# Patient Record
Sex: FEMALE | Race: WHITE | NOT HISPANIC OR LATINO | Employment: FULL TIME | URBAN - METROPOLITAN AREA
[De-identification: names, ages, dates, MRNs, and addresses within clinical notes are randomized per-mention and may not be internally consistent; named-entity substitution may affect disease eponyms.]

---

## 2017-05-24 ENCOUNTER — OFFICE VISIT (OUTPATIENT)
Dept: MEDICAL GROUP | Facility: PHYSICIAN GROUP | Age: 57
End: 2017-05-24
Payer: COMMERCIAL

## 2017-05-24 VITALS
DIASTOLIC BLOOD PRESSURE: 80 MMHG | SYSTOLIC BLOOD PRESSURE: 108 MMHG | HEART RATE: 96 BPM | OXYGEN SATURATION: 97 % | BODY MASS INDEX: 28.88 KG/M2 | HEIGHT: 63 IN | WEIGHT: 163 LBS | RESPIRATION RATE: 14 BRPM | TEMPERATURE: 98.8 F

## 2017-05-24 DIAGNOSIS — Z86.010 HISTORY OF COLON POLYPS: ICD-10-CM

## 2017-05-24 DIAGNOSIS — I10 ESSENTIAL HYPERTENSION: ICD-10-CM

## 2017-05-24 DIAGNOSIS — K63.5 POLYP OF COLON, UNSPECIFIED PART OF COLON, UNSPECIFIED TYPE: ICD-10-CM

## 2017-05-24 DIAGNOSIS — N81.10 FEMALE BLADDER PROLAPSE: ICD-10-CM

## 2017-05-24 DIAGNOSIS — G43.109 MIGRAINE WITH AURA AND WITHOUT STATUS MIGRAINOSUS, NOT INTRACTABLE: ICD-10-CM

## 2017-05-24 DIAGNOSIS — M79.671 RIGHT FOOT PAIN: ICD-10-CM

## 2017-05-24 DIAGNOSIS — E78.5 HYPERLIPIDEMIA WITH TARGET LDL LESS THAN 130: ICD-10-CM

## 2017-05-24 DIAGNOSIS — E04.9 GOITER: ICD-10-CM

## 2017-05-24 DIAGNOSIS — Z12.39 SCREENING FOR BREAST CANCER: ICD-10-CM

## 2017-05-24 PROCEDURE — 99214 OFFICE O/P EST MOD 30 MIN: CPT | Performed by: NURSE PRACTITIONER

## 2017-05-24 ASSESSMENT — PATIENT HEALTH QUESTIONNAIRE - PHQ9: CLINICAL INTERPRETATION OF PHQ2 SCORE: 1

## 2017-05-24 NOTE — ASSESSMENT & PLAN NOTE
Patient states that after she lost several pounds she went off the Lipitor. Her last lipids showed normal levels. It has been a year since her last labs. We will redraw and monitor. Patient is exercising regularly

## 2017-05-24 NOTE — ASSESSMENT & PLAN NOTE
Patient states that she was diagnosed with a goiter many years ago. Last TSH a year ago was within normal limits. It is been a year since that lab draw. Denies any cold intolerance, weight gain, hair loss. States her energy level has been good. She denies any edema or changes in her skin or nails. She denies difficulty swallowing.

## 2017-05-24 NOTE — ASSESSMENT & PLAN NOTE
Patient states that she has in the past seen Dr. Panchito rutledge. She reports some right foot pain especially on the lateral aspect. There she denies swelling or injury. She states that sometimes the pain just burns and aches.  She has tried essential oils.

## 2017-05-24 NOTE — ASSESSMENT & PLAN NOTE
Patient had a bowel prep and scheduled colonoscopy last fall. After she went to the colonoscopy appointment, she was unable to complete the colonoscopy due to incomplete prep. She has not returned since then. She does state that she did have a history of some polyps. She denies any bowel changes at this time including constipation diarrhea or blood in her stool. Referral placed for

## 2017-05-24 NOTE — MR AVS SNAPSHOT
"        Laya Barnes   2017 4:20 PM   Office Visit   MRN: [id number]    Department:  [facility]   Dept Phone:  [phone]    Description:  Female : 1960   Provider:  DINO Johnson           Reason for Visit     Establish Care           Allergies as of 2017     No Known Allergies      You were diagnosed with     Female bladder prolapse   [797981]       Essential hypertension   [7377360]       Screening for breast cancer   [704459]       History of colon polyps   [098464]       Right foot pain   [315008]         Vital Signs     Blood Pressure Pulse Temperature Respirations Height Weight    108/80 mmHg 96 37.1 °C (98.8 °F) 14 1.6 m (5' 3\") 73.936 kg (163 lb)    Body Mass Index Oxygen Saturation Smoking Status             28.88 kg/m2 97% Never Smoker          Basic Information     Date Of Birth Sex Race Ethnicity Preferred Language    1960 Female White Non- English      Problem List              ICD-10-CM Priority Class Noted - Resolved    Goiter E04.9   Unknown - Present    Migraine with aura and without status migrainosus, not intractable G43.109   Unknown - Present    HTN (hypertension) I10   2011 - Present    Flying phobia F40.243   2011 - Present    Colon polyps K63.5   2012 - Present    Hyperlipidemia with target LDL less than 130 E78.5   2012 - Present    Benign positional vertigo H81.10   2013 - Present    DDD (degenerative disc disease), cervical M50.30   2015 - Present    DDD (degenerative disc disease), lumbar M51.36   2015 - Present    Female bladder prolapse N81.10   2016 - Present    Right foot pain M79.671   2017 - Present      Health Maintenance        Date Due Completion Dates    COLONOSCOPY 2016 (N/S), 5/15/2011 (N/S)    Override on 2011: (N/S)    Override on 5/15/2011: (N/S)    MAMMOGRAM 7/10/2016 7/10/2015, 2013, 2013 (Done), 2012, 2011, 2010, 2010, " 7/21/2008, 7/21/2008, 7/17/2007, 7/17/2007, 4/11/2005, 1/23/2004    Override on 7/11/2013: Done    IMM DTaP/Tdap/Td Vaccine (2 - Td) 3/26/2017 3/26/2007    PAP SMEAR 7/15/2018 7/15/2015, 4/6/2012, 4/4/2011, 4/2/2010            Current Immunizations     Influenza TIV (IM) 10/21/2014    SHINGLES VACCINE 7/15/2015    Tdap Vaccine 3/26/2007      Below and/or attached are the medications your provider expects you to take. Review all of your home medications and newly ordered medications with your provider and/or pharmacist. Follow medication instructions as directed by your provider and/or pharmacist. Please keep your medication list with you and share with your provider. Update the information when medications are discontinued, doses are changed, or new medications (including over-the-counter products) are added; and carry medication information at all times in the event of emergency situations     Allergies:  No Known Allergies          Medications  Valid as of: May 24, 2017 -  4:53 PM    Generic Name Brand Name Tablet Size Instructions for use    Atorvastatin Calcium (Tab) LIPITOR 20 MG Take 1 Tab by mouth every day. TAKE ONE TABLET BY MOUTH DAILY        Calcium Citrate-Vitamin D   Take  by mouth every day.        Fluticasone Propionate (Suspension) FLONASE 50 MCG/ACT Spray 1 Spray in nose every day.        Meclizine HCl (Tab) ANTIVERT 12.5 MG Take 1 Tab by mouth 3 times a day as needed for Dizziness.        Omega-3 Fatty Acids (Cap) OMEGA 3 FA 1000 MG Take 1,000 mg by mouth 3 times a day, with meals.        Triamcinolone Acetonide (Ointment) KENALOG 0.1 % Apply to affected areas on hands twice daily PRN rash        .                 Medicines prescribed today were sent to:     [facility] #[id number] - [place], [place] - [address] [place] [address] AT [address] [address]    Phone: [phone] Fax: [phone]    Open 24 Hours?: No      Medication refill instructions:       If your prescription bottle  indicates you have medication refills left, it is not necessary to call your provider’s office. Please contact your pharmacy and they will refill your medication.    If your prescription bottle indicates you do not have any refills left, you may request refills at any time through one of the following ways: The online Revolve. system (except Urgent Care), by calling your provider’s office, or by asking your pharmacy to contact your provider’s office with a refill request. Medication refills are processed only during regular business hours and may not be available until the next business day. Your provider may request additional information or to have a follow-up visit with you prior to refilling your medication.   *Please Note: Medication refills are assigned a new Rx number when refilled electronically. Your pharmacy may indicate that no refills were authorized even though a new prescription for the same medication is available at the pharmacy. Please request the medicine by name with the pharmacy before contacting your provider for a refill.        Your To Do List     Future Labs/Procedures Complete By Expires    COMP METABOLIC PANEL  As directed 5/25/2018    LIPID PROFILE  As directed 5/25/2018    MA-SCREEN MAMMO W/CAD-BILAT  As directed 6/25/2018    TSH  As directed 5/25/2018    VITAMIN D,25 HYDROXY  As directed 5/25/2018      Referral     A referral request has been sent to our patient care coordination department. Please allow 3-5 business days for us to process this request and contact you either by phone or mail. If you do not hear from us by the 5th business day, please call us at [phone].           Revolve. Access Code: [email or web address]  Expires: 6/23/2017  4:04 PM    Revolve.  A secure, online tool to manage your health information     [employer]’s Revolve.® is a secure, online tool that connects you to your personalized health information from the privacy of your home -- day or night - making it  very easy for you to manage your healthcare. Once the activation process is completed, you can even access your medical information using the ComVibe svitlana, which is available for free in the Apple Svitlana store or Google Play store.     ComVibe provides the following levels of access (as shown below):   My Chart Features   [facility] Doctor [facility]  Specialists [facility]  Urgent  Care Non-[facility]  Primary Care  Doctor   Email your healthcare team securely and privately 24/7 X X X    Manage appointments: schedule your next appointment; view details of past/upcoming appointments X      Request prescription refills. X      View recent personal medical records, including lab and immunizations X X X X   View health record, including health history, allergies, medications X X X X   Read reports about your outpatient visits, procedures, consult and ER notes X X X X   See your discharge summary, which is a recap of your hospital and/or ER visit that includes your diagnosis, lab results, and care plan. X X       How to register for ComVibe:  1. Go to  [email or web address].  2. Click on the Sign Up Now box, which takes you to the New Member Sign Up page. You will need to provide the following information:  a. Enter your ComVibe Access Code exactly as it appears at the top of this page. (You will not need to use this code after you’ve completed the sign-up process. If you do not sign up before the expiration date, you must request a new code.)   b. Enter your date of birth.   c. Enter your home email address.   d. Click Submit, and follow the next screen’s instructions.  3. Create a ComVibe ID. This will be your ComVibe login ID and cannot be changed, so think of one that is secure and easy to remember.  4. Create a ComVibe password. You can change your password at any time.  5. Enter your Password Reset Question and Answer. This can be used at a later time if you forget your password.   6. Enter your e-mail address. This  allows you to receive e-mail notifications when new information is available in [employer].  7. Click Sign Up. You can now view your health information.    For assistance activating your [employer] account, call [phone]

## 2017-05-24 NOTE — ASSESSMENT & PLAN NOTE
Patient states that her last migraine was approximately 2-1/2 weeks ago. She states she does have an aura with migraine but she states that if she takes Advil as soon as she has the aura she is able to improve the outcome of her headache. States she maybe has 4-5 migraines in a year. She is not aware of any consistent trigger.

## 2017-05-24 NOTE — PROGRESS NOTES
Chief Complaint   Patient presents with   • Establish Care       Subjective:   Laya Barnes is a 57 y.o. female here today to establish care and for evaluation and management of:    Female bladder prolapse  Seeing Dr. Angela Toth for bladder prolapse.     HTN (hypertension)  Not taking medication since losing weight a few years ago.  bp at goal today.     Colon polyps  Patient had a bowel prep and scheduled colonoscopy last fall. After she went to the colonoscopy appointment, she was unable to complete the colonoscopy due to incomplete prep. She has not returned since then. She does state that she did have a history of some polyps. She denies any bowel changes at this time including constipation diarrhea or blood in her stool. Referral placed for    Hyperlipidemia with target LDL less than 130  Patient states that after she lost several pounds she went off the Lipitor. Her last lipids showed normal levels. It has been a year since her last labs. We will redraw and monitor. Patient is exercising regularly    Goiter  Patient states that she was diagnosed with a goiter many years ago. Last TSH a year ago was within normal limits. It is been a year since that lab draw. Denies any cold intolerance, weight gain, hair loss. States her energy level has been good. She denies any edema or changes in her skin or nails. She denies difficulty swallowing.    Migraine with aura and without status migrainosus, not intractable  Patient states that her last migraine was approximately 2-1/2 weeks ago. She states she does have an aura with migraine but she states that if she takes Advil as soon as she has the aura she is able to improve the outcome of her headache. States she maybe has 4-5 migraines in a year. She is not aware of any consistent trigger.    Right foot pain  Patient states that she has in the past seen Dr. Panchito rutledge. She reports some right foot pain especially on the lateral aspect. There she denies  "swelling or injury. She states that sometimes the pain just burns and aches.  She has tried essential oils.          Current medicines (including changes today)  Current Outpatient Prescriptions   Medication Sig Dispense Refill   • fluticasone (FLONASE) 50 MCG/ACT nasal spray Spray 1 Spray in nose every day. 1 Bottle 1   • atorvastatin (LIPITOR) 20 MG Tab Take 1 Tab by mouth every day. TAKE ONE TABLET BY MOUTH DAILY 30 Tab 1   • meclizine (ANTIVERT) 12.5 MG Tab Take 1 Tab by mouth 3 times a day as needed for Dizziness. 30 Tab 1   • triamcinolone acetonide (KENALOG) 0.1 % Ointment Apply to affected areas on hands twice daily PRN rash 60 g 1   • docosahexanoic acid (OMEGA 3 FA) 1000 MG CAPS Take 1,000 mg by mouth 3 times a day, with meals.     • Calcium Carbonate-Vitamin D (CALCIUM + D PO) Take  by mouth every day.       No current facility-administered medications for this visit.     She  has a past medical history of Hypertriglyceridemia; Goiter; Migraine; HTN (hypertension) (11/4/2011); Flying phobia (11/4/2011); Colon polyps (6/11); Hyperlipidemia LDL goal < 130 (6/29/2012); Hand eczema (8/4/2014); Bilateral foot pain (11/9/2015); and Hypertension.    ROS as stated in history of present illness.  No chest pain, no shortness of breath, no abdominal pain       Objective:     Blood pressure 108/80, pulse 96, temperature 37.1 °C (98.8 °F), resp. rate 14, height 1.6 m (5' 3\"), weight 73.936 kg (163 lb), SpO2 97 %. Body mass index is 28.88 kg/(m^2).   Physical Exam:  Constitutional: Alert, no distress.  Skin: Warm, dry, good turgor,no cyanosis, no rashes in visible areas.  Eye: Equal, round and reactive, conjunctiva clear, lids normal.  Ears: No tenderness, no discharge.  External canals are without any significant edema or erythema.  Tympanic membranes are without any inflammation, no effusion.  Gross auditory acuity is intact.  Nose: symmetrical without tenderness, no discharge.  Mouth/Throat: lips without lesion.  " Oropharynx clear.  Throat without erythema, exudates or tonsillar enlargement.  Neck: Trachea midline, no masses, no obvious thyroid enlargement.. No cervical or supraclavicular lymphadenopathy. Range of motion within normal limits.  Neuro: Cranial nerves 2-12 grossly intact.  No sensory deficit.  Respiratory: Unlabored respiratory effort, lungs clear to auscultation, no wheezes, no ronchi.  Cardiovascular: Normal S1, S2, no murmur, no edema.  Abdomen: Soft, non-tender, no masses, no guarding,  no hepatosplenomegaly.  MSK: Right foot with area of tenderness on the lateral aspect of the fifth metatarsal. No swelling redness noted.  Psych: Alert and oriented x3, normal affect and mood and judgement.        Assessment and Plan:   The following treatment plan was discussed    1. Female bladder prolapse  This is a new problem to me. Chronic. Unstable. She is being followed by Dr. Angela Toth. She is having the Selina Glo treatments. Next appointment in 2 weeks.    2. Essential hypertension  This is a new problem to me. Chronic. Stable. Patient is no longer on any medications after she lost a lot of weight. Her blood pressure today is 108/80. We discussed diet and exercise. We will draw labs to rule out any.metabolic issues.  - COMP METABOLIC PANEL; Future  - LIPID PROFILE; Future  - TSH; Future  - VITAMIN D,25 HYDROXY; Future    3. Screening for breast cancer  Patient is overdue for mammogram screening. Denies any breast changes or family history of breast cancer. Mammogram order placed. We'll monitor results.  - MA-SCREEN MAMMO W/CAD-BILAT; Future    4. History of colon polyps  Patient overdue for colonoscopy. Referral placed. We'll monitor results.  - REFERRAL TO GASTROENTEROLOGY    5. Right foot pain  This is a new problem to me. Acute. Ongoing. Patient to call her podiatrist, Dr. Ruiz for follow-up. She RBCs him for plantar fasciitis. Monitor and follow.    6. Polyp of colon, unspecified part of colon,  unspecified type  See problem #4.    7. Hyperlipidemia with target LDL less than 130  This is a new problem to me. Chronic. Stable. Lipids one year ago were at goal. She is not taking any medications. Will redraw lipids to monitor this level. Monitor and follow.    8. Goiter  This is a new problem to me. Chronic. Stable. Patient is due for TSH level. We will draw this and monitor results.    9. Migraine with aura and without status migrainosus, not intractable  This is a new problem to me. Chronic. Stable. Continue with Advil when necessary for migraines. Patient to return in one year or sooner      Followup: Return in about 1 year (around 5/24/2018) for Annual.

## 2017-06-21 ENCOUNTER — HOSPITAL ENCOUNTER (OUTPATIENT)
Dept: RADIOLOGY | Facility: MEDICAL CENTER | Age: 57
End: 2017-06-21
Attending: NURSE PRACTITIONER
Payer: COMMERCIAL

## 2017-06-21 DIAGNOSIS — Z12.39 SCREENING FOR BREAST CANCER: ICD-10-CM

## 2017-06-21 PROCEDURE — 77063 BREAST TOMOSYNTHESIS BI: CPT

## 2017-06-22 ENCOUNTER — TELEPHONE (OUTPATIENT)
Dept: MEDICAL GROUP | Facility: PHYSICIAN GROUP | Age: 57
End: 2017-06-22

## 2017-06-22 NOTE — TELEPHONE ENCOUNTER
----- Message from DINO Johnson sent at 6/21/2017  5:25 PM PDT -----  Please let Laya know that her mammogram results are back.  Both breast have scattered density, but no changes since last mammogram.  No evidence of malignancy.  Recommend mammo in one year.  DINO Johnson

## 2017-06-24 ENCOUNTER — HOSPITAL ENCOUNTER (OUTPATIENT)
Dept: LAB | Facility: MEDICAL CENTER | Age: 57
End: 2017-06-24
Attending: NURSE PRACTITIONER
Payer: COMMERCIAL

## 2017-06-24 DIAGNOSIS — I10 ESSENTIAL HYPERTENSION: ICD-10-CM

## 2017-06-24 LAB
25(OH)D3 SERPL-MCNC: 30 NG/ML (ref 30–100)
ALBUMIN SERPL BCP-MCNC: 4.2 G/DL (ref 3.2–4.9)
ALBUMIN/GLOB SERPL: 1.4 G/DL
ALP SERPL-CCNC: 67 U/L (ref 30–99)
ALT SERPL-CCNC: 11 U/L (ref 2–50)
ANION GAP SERPL CALC-SCNC: 4 MMOL/L (ref 0–11.9)
AST SERPL-CCNC: 14 U/L (ref 12–45)
BILIRUB SERPL-MCNC: 0.3 MG/DL (ref 0.1–1.5)
BUN SERPL-MCNC: 15 MG/DL (ref 8–22)
CALCIUM SERPL-MCNC: 9.1 MG/DL (ref 8.5–10.5)
CHLORIDE SERPL-SCNC: 109 MMOL/L (ref 96–112)
CHOLEST SERPL-MCNC: 233 MG/DL (ref 100–199)
CO2 SERPL-SCNC: 27 MMOL/L (ref 20–33)
CREAT SERPL-MCNC: 0.95 MG/DL (ref 0.5–1.4)
GFR SERPL CREATININE-BSD FRML MDRD: >60 ML/MIN/1.73 M 2
GLOBULIN SER CALC-MCNC: 2.9 G/DL (ref 1.9–3.5)
GLUCOSE SERPL-MCNC: 95 MG/DL (ref 65–99)
HDLC SERPL-MCNC: 46 MG/DL
LDLC SERPL CALC-MCNC: 146 MG/DL
POTASSIUM SERPL-SCNC: 4.2 MMOL/L (ref 3.6–5.5)
PROT SERPL-MCNC: 7.1 G/DL (ref 6–8.2)
SODIUM SERPL-SCNC: 140 MMOL/L (ref 135–145)
TRIGL SERPL-MCNC: 207 MG/DL (ref 0–149)
TSH SERPL DL<=0.005 MIU/L-ACNC: 2.92 UIU/ML (ref 0.3–3.7)

## 2017-06-24 PROCEDURE — 80053 COMPREHEN METABOLIC PANEL: CPT

## 2017-06-24 PROCEDURE — 80061 LIPID PANEL: CPT

## 2017-06-24 PROCEDURE — 84443 ASSAY THYROID STIM HORMONE: CPT

## 2017-06-24 PROCEDURE — 36415 COLL VENOUS BLD VENIPUNCTURE: CPT

## 2017-06-24 PROCEDURE — 82306 VITAMIN D 25 HYDROXY: CPT

## 2017-06-26 ENCOUNTER — TELEPHONE (OUTPATIENT)
Dept: MEDICAL GROUP | Facility: PHYSICIAN GROUP | Age: 57
End: 2017-06-26

## 2017-06-26 DIAGNOSIS — E78.5 HYPERLIPIDEMIA WITH TARGET LDL LESS THAN 130: ICD-10-CM

## 2017-06-26 RX ORDER — ATORVASTATIN CALCIUM 20 MG/1
20 TABLET, FILM COATED ORAL DAILY
Qty: 30 TAB | Refills: 6 | Status: SHIPPED | OUTPATIENT
Start: 2017-06-26 | End: 2017-06-28 | Stop reason: SDUPTHER

## 2017-06-26 NOTE — TELEPHONE ENCOUNTER
----- Message from DINO Johnson sent at 6/26/2017  7:20 AM PDT -----  Please let Laya know that her lab work results are back.  Her liver and thyroid function all look good.  Kidney function is good.  One of the kidney tests is a little low, but it is stable when compared to the last 3 years.  Your cholesterol and triglycerides are slightly elevated.  Are you taking lipitor or not?  I see it on your med list, but not sure if you are taking.  Please let me know.  If you are not taking, I think it would be good to get back on it.  I will await your reply.  DINO Johnson

## 2017-06-26 NOTE — TELEPHONE ENCOUNTER
Please let patient know that I have called in the Lipitor to [facility].  Please start taking daily.  Continue to focus on healthy diet and getting 20 minutes of cardiovascular exercise daily.  Let's recheck in 6 months.  Please call to make a follow up appointment with your provider.  WILLOW Johnson.

## 2017-06-27 ENCOUNTER — TELEPHONE (OUTPATIENT)
Dept: MEDICAL GROUP | Facility: PHYSICIAN GROUP | Age: 57
End: 2017-06-27

## 2017-06-27 DIAGNOSIS — E78.5 HYPERLIPIDEMIA WITH TARGET LDL LESS THAN 130: ICD-10-CM

## 2017-06-27 NOTE — TELEPHONE ENCOUNTER
Patient notified of results . Has not been taking her Lipitor for a year. Willing to restart due to her results

## 2017-06-28 RX ORDER — ATORVASTATIN CALCIUM 20 MG/1
20 TABLET, FILM COATED ORAL DAILY
Qty: 30 TAB | Refills: 6 | Status: SHIPPED | OUTPATIENT
Start: 2017-06-28 | End: 2018-06-26

## 2017-06-28 NOTE — TELEPHONE ENCOUNTER
Please let patient know that I have called in the lipitor to [facility].  Take one tablet daily, preferrably at night.  I would also recommend focusing on increasing exercise, at least 150 minutes in a week of walking, biking, etc (something that gets your heart rate up.)  WILLOW Johnson.

## 2018-01-06 ENCOUNTER — OFFICE VISIT (OUTPATIENT)
Dept: URGENT CARE | Facility: PHYSICIAN GROUP | Age: 58
End: 2018-01-06
Payer: COMMERCIAL

## 2018-01-06 VITALS
BODY MASS INDEX: 29.23 KG/M2 | DIASTOLIC BLOOD PRESSURE: 84 MMHG | HEIGHT: 63 IN | WEIGHT: 165 LBS | SYSTOLIC BLOOD PRESSURE: 110 MMHG | OXYGEN SATURATION: 100 % | TEMPERATURE: 98.4 F | HEART RATE: 77 BPM

## 2018-01-06 DIAGNOSIS — J06.9 VIRAL URI: ICD-10-CM

## 2018-01-06 DIAGNOSIS — J22 LRTI (LOWER RESPIRATORY TRACT INFECTION): ICD-10-CM

## 2018-01-06 PROCEDURE — 99214 OFFICE O/P EST MOD 30 MIN: CPT | Performed by: FAMILY MEDICINE

## 2018-01-06 RX ORDER — AZITHROMYCIN 250 MG/1
TABLET, FILM COATED ORAL
Qty: 6 TAB | Refills: 0 | Status: SHIPPED | OUTPATIENT
Start: 2018-01-06 | End: 2018-04-30

## 2018-01-06 RX ORDER — ALBUTEROL SULFATE 90 UG/1
2 AEROSOL, METERED RESPIRATORY (INHALATION) EVERY 6 HOURS PRN
Qty: 1 INHALER | Refills: 0 | Status: SHIPPED | OUTPATIENT
Start: 2018-01-06 | End: 2019-06-12

## 2018-01-06 RX ORDER — IPRATROPIUM BROMIDE AND ALBUTEROL SULFATE 2.5; .5 MG/3ML; MG/3ML
3 SOLUTION RESPIRATORY (INHALATION) ONCE
Status: COMPLETED | OUTPATIENT
Start: 2018-01-06 | End: 2018-01-06

## 2018-01-06 RX ADMIN — IPRATROPIUM BROMIDE AND ALBUTEROL SULFATE 3 ML: 2.5; .5 SOLUTION RESPIRATORY (INHALATION) at 13:56

## 2018-01-06 ASSESSMENT — PAIN SCALES - GENERAL: PAINLEVEL: NO PAIN

## 2018-01-06 NOTE — PROGRESS NOTES
Chief Complaint   Patient presents with   • Congestion     Cough and chest congestion x1 week     CC:  cough        Cough  This is a new problem. The current episode started 1 week ago. The problem has been gradually worsening. The problem occurs constantly. The cough is productive of sputum. Associated symptoms include nasal congestion. Pertinent negatives include no fever, headaches, sweats, weight loss or wheezing. Nothing aggravates the symptoms.  Patient has tried decongestant for the symptoms - minimal relief. There is no history of asthma.     Social History   Substance Use Topics   • Smoking status: Never Smoker   • Smokeless tobacco: Never Used   • Alcohol use Yes      Comment: Very Rare         Current Outpatient Prescriptions on File Prior to Visit   Medication Sig Dispense Refill   • atorvastatin (LIPITOR) 20 MG Tab Take 1 Tab by mouth every day. TAKE ONE TABLET BY MOUTH DAILY 30 Tab 6   • fluticasone (FLONASE) 50 MCG/ACT nasal spray Spray 1 Spray in nose every day. 1 Bottle 1   • docosahexanoic acid (OMEGA 3 FA) 1000 MG CAPS Take 1,000 mg by mouth 3 times a day, with meals.     • Calcium Carbonate-Vitamin D (CALCIUM + D PO) Take  by mouth every day.     • meclizine (ANTIVERT) 12.5 MG Tab Take 1 Tab by mouth 3 times a day as needed for Dizziness. 30 Tab 1   • triamcinolone acetonide (KENALOG) 0.1 % Ointment Apply to affected areas on hands twice daily PRN rash 60 g 1     No current facility-administered medications on file prior to visit.                 Review of Systems   Constitutional: Negative for fever and weight loss.   HENT: negative for ear pain.    Cardiovascular - denies chest pain or dyspnea  Respiratory: Positive for cough.  Cough is productive.  Negative for wheezing.    Neurological: Negative for headaches.   GI - denies nausea, vomiting or diarrhea  Neuro - denies numbness or tingling.            Objective:     Blood pressure 110/84, pulse 77, temperature 36.9 °C (98.4 °F), height 1.6 m  "(5' 3\"), weight 74.8 kg (165 lb), SpO2 100 %, not currently breastfeeding.    Physical Exam   Constitutional: patient is oriented to person, place, and time. Patient appears well-developed and well-nourished. No distress.   HENT:   Head: Normocephalic and atraumatic.   Right Ear: External ear normal.   Left Ear: External ear normal.   Nose: Mucosal edema present. Right sinus exhibits no maxillary sinus tenderness. Left sinus exhibits no maxillary sinus tenderness.   Mouth/Throat: Mucous membranes are normal. No oral lesions. Posterior oropharyngeal erythema present. No oropharyngeal exudate or posterior oropharyngeal edema.   Eyes: Conjunctivae and EOM are normal. Pupils are equal, round, and reactive to light. Right eye exhibits no discharge. Left eye exhibits no discharge. No scleral icterus.   Neck: Normal range of motion. Neck supple. No tracheal deviation present.   Cardiovascular: Normal rate, regular rhythm and normal heart sounds.  Exam reveals no friction rub.    Pulmonary/Chest: Effort normal. No respiratory distress. Patient has no wheezes. Patient has rhonchi. Patient has no rales.    Musculoskeletal:  exhibits no edema.   Lymphadenopathy:     Patient has no cervical adenopathy  Neurological: patient is alert and oriented to person, place, and time.   Skin: Skin is warm and dry. No rash noted. No erythema.   Psychiatric: patient  has a normal mood and affect.  behavior is normal.   Nursing note and vitals reviewed.              Assessment/Plan:      LRTI (lower respiratory tract infection)   oxygen increased to 99% after neb tx  - albuterol 108 (90 Base) MCG/ACT Aero Soln inhalation aerosol; Inhale 2 Puffs by mouth every 6 hours as needed for Shortness of Breath.  Dispense: 1 Inhaler; Refill: 0  - azithromycin (ZITHROMAX) 250 MG Tab; Take as directed  Dispense: 6 Tab; Refill: 0    Follow up in one week if no improvement, sooner if symptoms worsen.     "

## 2018-04-30 ENCOUNTER — OFFICE VISIT (OUTPATIENT)
Dept: MEDICAL GROUP | Facility: PHYSICIAN GROUP | Age: 58
End: 2018-04-30
Payer: COMMERCIAL

## 2018-04-30 VITALS
SYSTOLIC BLOOD PRESSURE: 124 MMHG | OXYGEN SATURATION: 97 % | DIASTOLIC BLOOD PRESSURE: 82 MMHG | BODY MASS INDEX: 30.83 KG/M2 | RESPIRATION RATE: 12 BRPM | HEART RATE: 86 BPM | TEMPERATURE: 98.1 F | WEIGHT: 174 LBS | HEIGHT: 63 IN

## 2018-04-30 DIAGNOSIS — I10 ESSENTIAL HYPERTENSION: ICD-10-CM

## 2018-04-30 DIAGNOSIS — H81.13 BENIGN PAROXYSMAL POSITIONAL VERTIGO DUE TO BILATERAL VESTIBULAR DISORDER: ICD-10-CM

## 2018-04-30 DIAGNOSIS — M79.644 CHRONIC THUMB PAIN, RIGHT: ICD-10-CM

## 2018-04-30 DIAGNOSIS — G89.29 CHRONIC THUMB PAIN, RIGHT: ICD-10-CM

## 2018-04-30 DIAGNOSIS — Z00.00 PREVENTATIVE HEALTH CARE: ICD-10-CM

## 2018-04-30 DIAGNOSIS — E66.9 OBESITY (BMI 30-39.9): ICD-10-CM

## 2018-04-30 DIAGNOSIS — G43.109 MIGRAINE WITH AURA AND WITHOUT STATUS MIGRAINOSUS, NOT INTRACTABLE: ICD-10-CM

## 2018-04-30 DIAGNOSIS — L57.8 SUN-DAMAGED SKIN: ICD-10-CM

## 2018-04-30 DIAGNOSIS — N81.10 FEMALE BLADDER PROLAPSE: ICD-10-CM

## 2018-04-30 PROCEDURE — 99396 PREV VISIT EST AGE 40-64: CPT | Performed by: NURSE PRACTITIONER

## 2018-04-30 RX ORDER — IBUPROFEN 800 MG/1
800 TABLET ORAL EVERY 8 HOURS PRN
Qty: 60 TAB | Refills: 1 | Status: SHIPPED | OUTPATIENT
Start: 2018-04-30 | End: 2023-05-07

## 2018-04-30 ASSESSMENT — PATIENT HEALTH QUESTIONNAIRE - PHQ9: CLINICAL INTERPRETATION OF PHQ2 SCORE: 0

## 2018-05-01 NOTE — ASSESSMENT & PLAN NOTE
Has notied in the last several months some chronic right thumb pain.  Right handed.  Swelling noted at mp joint.  No bruising, or injury reported.  Has tried some icy hot without improved.

## 2018-05-01 NOTE — ASSESSMENT & PLAN NOTE
Stable.  Has a migraine every 2-3 months.  Unknown trigger.  Takes an ibuprofen as soon as they start that helps.

## 2018-05-01 NOTE — PROGRESS NOTES
Chief Complaint   Patient presents with   • Annual Exam     annual/ preventative       Subjective:   Laya Barnes is a 58 y.o. female here today for preventative exam f    Benign positional vertigo  No recent flare ups.      Migraine with aura and without status migrainosus, not intractable  Stable.  Has a migraine every 2-3 months.  Unknown trigger.  Takes an ibuprofen as soon as they start that helps.     Chronic thumb pain, right  Has notied in the last several months some chronic right thumb pain.  Right handed.  Swelling noted at mp joint.  No bruising, or injury reported.  Has tried some icy hot without improved.      HTN (hypertension)  Blood pressure at goal today    Female bladder prolapse  Seeing Dr. Toth.  Using estrace cream with good results         Current medicines (including changes today)  Current Outpatient Prescriptions   Medication Sig Dispense Refill   • ibuprofen (MOTRIN) 800 MG Tab Take 1 Tab by mouth every 8 hours as needed for Inflammation. 60 Tab 1   • docosahexanoic acid (OMEGA 3 FA) 1000 MG CAPS Take 1,000 mg by mouth 3 times a day, with meals.     • Calcium Carbonate-Vitamin D (CALCIUM + D PO) Take  by mouth every day.     • albuterol 108 (90 Base) MCG/ACT Aero Soln inhalation aerosol Inhale 2 Puffs by mouth every 6 hours as needed for Shortness of Breath. 1 Inhaler 0   • atorvastatin (LIPITOR) 20 MG Tab Take 1 Tab by mouth every day. TAKE ONE TABLET BY MOUTH DAILY 30 Tab 6   • fluticasone (FLONASE) 50 MCG/ACT nasal spray Spray 1 Spray in nose every day. 1 Bottle 1     No current facility-administered medications for this visit.      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    ROS as stated in hpi  No chest pain, no shortness of breath, no abdominal  "pain       Objective:     Blood pressure 124/82, pulse 86, temperature 36.7 °C (98.1 °F), resp. rate 12, height 1.6 m (5' 3\"), weight 78.9 kg (174 lb), SpO2 97 %. Body mass index is 30.82 kg/m². stable.   Physical Exam:  Constitutional: Alert, no distress.  Skin: Warm, dry, good turgor,no cyanosis, no rashes in visible areas.  Eye: Equal, round and reactive, conjunctiva clear, lids normal.  Ears: No tenderness, no discharge.  External canals are without any significant edema or erythema.  Tympanic membranes are without any inflammation, no effusion.  Gross auditory acuity is intact.  Nose: symmetrical without tenderness, no discharge.  Mouth/Throat: lips without lesion.  Oropharynx clear.  Throat without erythema, exudates or tonsillar enlargement.  Neck: Trachea midline, no masses, no obvious thyroid enlargement.. No cervical or supraclavicular lymphadenopathy. Range of motion within normal limits.  Neuro: Cranial nerves 2-12 grossly intact.  No sensory deficit.  Respiratory: Unlabored respiratory effort, lungs clear to auscultation, no wheezes, no ronchi.  Cardiovascular: Normal S1, S2, no murmur, no edema.  Abdomen: Soft, non-tender, no masses, no guarding,  no hepatosplenomegaly.  Psych: Alert and oriented x3, normal affect and mood and judgement.  MSK: right thumb painful and swollen at MIP joint.  CMS WNL        Assessment and Plan:   The following treatment plan was discussed    1. Benign paroxysmal positional vertigo due to bilateral vestibular disorder  Chronic, stable at this time.     2. Migraine with aura and without status migrainosus, not intractable  Chronic, Stable at this time.  Ibuprofen prn.     3. Preventative health care  Here for preventative exam.  Up to date on mammo, colonoscopy, immunizations.    - CBC WITH DIFFERENTIAL; Future  - COMP METABOLIC PANEL; Future  - LIPID PROFILE; Future  - TSH; Future  - VITAMIN D,25 HYDROXY; Future    4. Chronic thumb pain, right  This is a new problem to me. "  Chronic.  Ice, heat, thumb brace, aspercream,  Ibuprofen 800 mg bid for one week.  Monitor and follow.     5. Essential hypertension  Chronic. Stable BP at goal.  Continue diet and exercise.  No medication.     6. Sun-damaged skin  Chronic, ongoing.  Referral placed.  Monitor.   - REFERRAL TO DERMATOLOGY    7. Female bladder prolapse  Chronic, ongoing. Followed by Dr. Toth.  Vaginal cream.  Monitor.     8. Obesity (BMI 30-39.9)  CHronic. Ongoing.  Diet and exercise and lifestyle management discussed.  Monitor and follow.   - Patient identified as having weight management issue.  Appropriate orders and counseling given.      Followup: Return in about 1 year (around 4/30/2019) for Annual.

## 2018-06-25 ENCOUNTER — HOSPITAL ENCOUNTER (OUTPATIENT)
Dept: LAB | Facility: MEDICAL CENTER | Age: 58
End: 2018-06-25
Attending: NURSE PRACTITIONER
Payer: COMMERCIAL

## 2018-06-25 DIAGNOSIS — Z00.00 PREVENTATIVE HEALTH CARE: ICD-10-CM

## 2018-06-25 LAB
25(OH)D3 SERPL-MCNC: 22 NG/ML (ref 30–100)
ALBUMIN SERPL BCP-MCNC: 4 G/DL (ref 3.2–4.9)
ALBUMIN/GLOB SERPL: 1.2 G/DL
ALP SERPL-CCNC: 58 U/L (ref 30–99)
ALT SERPL-CCNC: 13 U/L (ref 2–50)
ANION GAP SERPL CALC-SCNC: 7 MMOL/L (ref 0–11.9)
AST SERPL-CCNC: 22 U/L (ref 12–45)
BASOPHILS # BLD AUTO: 0.6 % (ref 0–1.8)
BASOPHILS # BLD: 0.03 K/UL (ref 0–0.12)
BILIRUB SERPL-MCNC: 0.3 MG/DL (ref 0.1–1.5)
BUN SERPL-MCNC: 14 MG/DL (ref 8–22)
CALCIUM SERPL-MCNC: 9 MG/DL (ref 8.5–10.5)
CHLORIDE SERPL-SCNC: 108 MMOL/L (ref 96–112)
CHOLEST SERPL-MCNC: 239 MG/DL (ref 100–199)
CO2 SERPL-SCNC: 26 MMOL/L (ref 20–33)
CREAT SERPL-MCNC: 1 MG/DL (ref 0.5–1.4)
EOSINOPHIL # BLD AUTO: 0.16 K/UL (ref 0–0.51)
EOSINOPHIL NFR BLD: 3.2 % (ref 0–6.9)
ERYTHROCYTE [DISTWIDTH] IN BLOOD BY AUTOMATED COUNT: 43.1 FL (ref 35.9–50)
GLOBULIN SER CALC-MCNC: 3.3 G/DL (ref 1.9–3.5)
GLUCOSE SERPL-MCNC: 90 MG/DL (ref 65–99)
HCT VFR BLD AUTO: 41.7 % (ref 37–47)
HDLC SERPL-MCNC: 44 MG/DL
HGB BLD-MCNC: 13.2 G/DL (ref 12–16)
IMM GRANULOCYTES # BLD AUTO: 0.01 K/UL (ref 0–0.11)
IMM GRANULOCYTES NFR BLD AUTO: 0.2 % (ref 0–0.9)
LDLC SERPL CALC-MCNC: 159 MG/DL
LYMPHOCYTES # BLD AUTO: 1.72 K/UL (ref 1–4.8)
LYMPHOCYTES NFR BLD: 34.6 % (ref 22–41)
MCH RBC QN AUTO: 27.6 PG (ref 27–33)
MCHC RBC AUTO-ENTMCNC: 31.7 G/DL (ref 33.6–35)
MCV RBC AUTO: 87.2 FL (ref 81.4–97.8)
MONOCYTES # BLD AUTO: 0.37 K/UL (ref 0–0.85)
MONOCYTES NFR BLD AUTO: 7.4 % (ref 0–13.4)
NEUTROPHILS # BLD AUTO: 2.68 K/UL (ref 2–7.15)
NEUTROPHILS NFR BLD: 54 % (ref 44–72)
NRBC # BLD AUTO: 0 K/UL
NRBC BLD-RTO: 0 /100 WBC
PLATELET # BLD AUTO: 250 K/UL (ref 164–446)
PMV BLD AUTO: 11 FL (ref 9–12.9)
POTASSIUM SERPL-SCNC: 4.2 MMOL/L (ref 3.6–5.5)
PROT SERPL-MCNC: 7.3 G/DL (ref 6–8.2)
RBC # BLD AUTO: 4.78 M/UL (ref 4.2–5.4)
SODIUM SERPL-SCNC: 141 MMOL/L (ref 135–145)
TRIGL SERPL-MCNC: 181 MG/DL (ref 0–149)
TSH SERPL DL<=0.005 MIU/L-ACNC: 3.18 UIU/ML (ref 0.38–5.33)
WBC # BLD AUTO: 5 K/UL (ref 4.8–10.8)

## 2018-06-25 PROCEDURE — 80061 LIPID PANEL: CPT

## 2018-06-25 PROCEDURE — 84443 ASSAY THYROID STIM HORMONE: CPT

## 2018-06-25 PROCEDURE — 85025 COMPLETE CBC W/AUTO DIFF WBC: CPT

## 2018-06-25 PROCEDURE — 80053 COMPREHEN METABOLIC PANEL: CPT

## 2018-06-25 PROCEDURE — 36415 COLL VENOUS BLD VENIPUNCTURE: CPT

## 2018-06-25 PROCEDURE — 82306 VITAMIN D 25 HYDROXY: CPT

## 2018-06-26 ENCOUNTER — TELEPHONE (OUTPATIENT)
Dept: MEDICAL GROUP | Facility: PHYSICIAN GROUP | Age: 58
End: 2018-06-26

## 2018-06-26 DIAGNOSIS — E78.5 HYPERLIPIDEMIA WITH TARGET LDL LESS THAN 130: ICD-10-CM

## 2018-06-26 RX ORDER — ATORVASTATIN CALCIUM 20 MG/1
20 TABLET, FILM COATED ORAL DAILY
Qty: 30 TAB | Refills: 0 | Status: CANCELLED | OUTPATIENT
Start: 2018-06-26

## 2018-06-26 NOTE — TELEPHONE ENCOUNTER
----- Message from DINO Stevens sent at 6/26/2018  8:49 AM PDT -----  Please call patient to inform her of the following labs:    -Thyroid levels normal  -Blood count normal   -Liver and kidney function are normal  -Cholesterol is not well controlled. Please clarify if she has been taking her a atorvastatin or not    -Vitamin D is slightly low. Please start OTC vitamin D 2000 units daily in addition to any vitamin D she is already taking    DINO Stevens  Covering for JONATHAN Johnson

## 2018-06-26 NOTE — TELEPHONE ENCOUNTER
Although it is not well controlled, in the big picture of her as a whole, cholesterol medication is not recommended yet. Her 10 year risk of having a heart attack or stroke is on 3.5% and the recommendation for therapy is 7.5% or greater. I would recommend lifestyle modifications with healthy nutrition and regular physical activity. Monitor cholesterol annually    WILLOW Stevens.

## 2018-06-26 NOTE — TELEPHONE ENCOUNTER
Pt. Notified. She has not been taking it and had discussed this with Roseline and they were going to wait and see. She is willing to go back on. Please ok refill.

## 2018-10-23 ENCOUNTER — OFFICE VISIT (OUTPATIENT)
Dept: URGENT CARE | Facility: PHYSICIAN GROUP | Age: 58
End: 2018-10-23
Payer: COMMERCIAL

## 2018-10-23 VITALS
HEIGHT: 63 IN | WEIGHT: 174 LBS | HEART RATE: 85 BPM | OXYGEN SATURATION: 96 % | BODY MASS INDEX: 30.83 KG/M2 | DIASTOLIC BLOOD PRESSURE: 90 MMHG | RESPIRATION RATE: 14 BRPM | SYSTOLIC BLOOD PRESSURE: 128 MMHG | TEMPERATURE: 98.4 F

## 2018-10-23 DIAGNOSIS — J01.00 ACUTE NON-RECURRENT MAXILLARY SINUSITIS: ICD-10-CM

## 2018-10-23 PROCEDURE — 99214 OFFICE O/P EST MOD 30 MIN: CPT | Performed by: FAMILY MEDICINE

## 2018-10-23 RX ORDER — AMOXICILLIN AND CLAVULANATE POTASSIUM 875; 125 MG/1; MG/1
1 TABLET, FILM COATED ORAL 2 TIMES DAILY
Qty: 14 TAB | Refills: 0 | Status: SHIPPED | OUTPATIENT
Start: 2018-10-23 | End: 2018-10-30

## 2018-10-28 ASSESSMENT — ENCOUNTER SYMPTOMS
VOMITING: 0
NAUSEA: 0
SINUS PAIN: 1
HEADACHES: 1

## 2018-10-28 NOTE — PROGRESS NOTES
"Subjective:   Laya Barnes is a 58 y.o. female who presents for URI (x 10 days)        URI    This is a new problem. The current episode started 1 to 4 weeks ago. The problem has been gradually worsening. There has been no fever. Associated symptoms include congestion, headaches and sinus pain. Pertinent negatives include no nausea or vomiting.     Review of Systems   HENT: Positive for congestion and sinus pain.    Gastrointestinal: Negative for nausea and vomiting.   Neurological: Positive for headaches.     No Known Allergies   Objective:   /90   Pulse 85   Temp 36.9 °C (98.4 °F)   Resp 14   Ht 1.6 m (5' 3\")   Wt 78.9 kg (174 lb)   SpO2 96%   BMI 30.82 kg/m²   Physical Exam   Constitutional: She is oriented to person, place, and time. She appears well-developed and well-nourished. No distress.   HENT:   Head: Normocephalic and atraumatic.   Nose: Mucosal edema and rhinorrhea present. Right sinus exhibits maxillary sinus tenderness. Left sinus exhibits maxillary sinus tenderness.   Eyes: Pupils are equal, round, and reactive to light. Conjunctivae and EOM are normal.   Cardiovascular: Normal rate and regular rhythm.    No murmur heard.  Pulmonary/Chest: Effort normal and breath sounds normal. No respiratory distress.   Abdominal: Soft. She exhibits no distension. There is no tenderness.   Neurological: She is alert and oriented to person, place, and time. She has normal reflexes. No sensory deficit.   Skin: Skin is warm and dry.   Psychiatric: She has a normal mood and affect.         Assessment/Plan:   Assessment    1. Acute non-recurrent maxillary sinusitis  - amoxicillin-clavulanate (AUGMENTIN) 875-125 MG Tab; Take 1 Tab by mouth 2 times a day for 7 days.  Dispense: 14 Tab; Refill: 0    Differential diagnosis, natural history, supportive care, and indications for immediate follow-up discussed.       "

## 2019-02-19 ENCOUNTER — OFFICE VISIT (OUTPATIENT)
Dept: URGENT CARE | Facility: PHYSICIAN GROUP | Age: 59
End: 2019-02-19
Payer: COMMERCIAL

## 2019-02-19 VITALS
OXYGEN SATURATION: 100 % | SYSTOLIC BLOOD PRESSURE: 122 MMHG | TEMPERATURE: 97.8 F | HEART RATE: 100 BPM | WEIGHT: 162 LBS | BODY MASS INDEX: 28.7 KG/M2 | HEIGHT: 63 IN | DIASTOLIC BLOOD PRESSURE: 92 MMHG | RESPIRATION RATE: 18 BRPM

## 2019-02-19 DIAGNOSIS — H66.90 ACUTE OTITIS MEDIA, UNSPECIFIED OTITIS MEDIA TYPE: ICD-10-CM

## 2019-02-19 DIAGNOSIS — J01.00 ACUTE NON-RECURRENT MAXILLARY SINUSITIS: ICD-10-CM

## 2019-02-19 PROCEDURE — 99214 OFFICE O/P EST MOD 30 MIN: CPT | Performed by: PHYSICIAN ASSISTANT

## 2019-02-19 RX ORDER — AMOXICILLIN AND CLAVULANATE POTASSIUM 875; 125 MG/1; MG/1
1 TABLET, FILM COATED ORAL 2 TIMES DAILY
Qty: 20 TAB | Refills: 0 | Status: SHIPPED | OUTPATIENT
Start: 2019-02-19 | End: 2019-03-01

## 2019-02-19 RX ORDER — FLUTICASONE PROPIONATE 50 MCG
1 SPRAY, SUSPENSION (ML) NASAL DAILY
Qty: 16 G | Refills: 0 | Status: SHIPPED
Start: 2019-02-19 | End: 2019-12-19

## 2019-02-19 ASSESSMENT — ENCOUNTER SYMPTOMS
HOARSE VOICE: 1
SINUS PRESSURE: 1
COUGH: 1
SORE THROAT: 1

## 2019-02-19 NOTE — LETTER
[facility] [place]  [address] [address]     February 19, 2019    Patient: Laya Barnes   YOB: 1960   Date of Visit: 2/19/2019       To Whom It May Concern:    Laya Barnes was seen and treated in our department on 2/19/2019. Please excuse her from work 2/19-2/20.    Sincerely,     Ming Mcfarland

## 2019-02-19 NOTE — PROGRESS NOTES
Subjective:   Laya Barnes is a 58 y.o. female who presents for Cough (congestion, sore [id number])        Sinusitis   This is a new problem. The current episode started 1 to 4 weeks ago (2 weeks). The problem has been waxing and waning (was improving and now worsening) since onset. There has been no fever. The pain is moderate. Associated symptoms include congestion, coughing, ear pain, a hoarse voice, sinus pressure and a sore throat. Past treatments include nothing.     Review of Systems   HENT: Positive for congestion, ear pain, hoarse voice, sinus pressure and sore throat.    Respiratory: Positive for cough.        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  MEDS:   Current Outpatient Prescriptions:   •  amoxicillin-clavulanate (AUGMENTIN) 875-125 MG Tab, Take 1 Tab by mouth 2 times a day for 10 days., Disp: 20 Tab, Rfl: 0  •  fluticasone (FLONASE) 50 MCG/ACT nasal spray, Spray 1 Spray in nose every day., Disp: 16 g, Rfl: 0  •  docosahexanoic acid (OMEGA 3 FA) 1000 MG CAPS, Take 1,000 mg by mouth 3 times a day, with meals., Disp: , Rfl:   •  Calcium Carbonate-Vitamin D (CALCIUM + D PO), Take  by mouth every day., Disp: , Rfl:   •  ibuprofen (MOTRIN) 800 MG Tab, Take 1 Tab by mouth every 8 hours as needed for Inflammation. (Patient not taking: Reported on 2/19/2019), Disp: 60 Tab, Rfl: 1  •  albuterol 108 (90 Base) MCG/ACT Aero Soln inhalation aerosol, Inhale 2 Puffs by mouth every 6 hours as needed for Shortness of Breath. (Patient not taking: Reported on 2/19/2019), Disp: 1 Inhaler, Rfl: 0  •  fluticasone (FLONASE) 50 MCG/ACT nasal spray, Spray 1 Spray in nose every day. (Patient not taking: Reported on 2/19/2019), Disp: 1 Bottle, Rfl: 1  ALLERGIES: No Known  "Allergies  SURGHX:   Past Surgical History:   Procedure Laterality Date   • MASS EXCISION ORTHO  2013    Performed by Gigi Chung M.D. at SURGERY [facility]   • TENDON REPAIR  2013    Performed by Gigi Chung M.D. at SURGERY [facility]   • COLONOSCOPY     • PB  DELIVERY ONLY     • TONSILLECTOMY AND ADENOIDECTOMY       SOCHX:  reports that she has never smoked. She has never used smokeless tobacco. She reports that she drinks alcohol. She reports that she does not use drugs.  FH: Family history was reviewed, no pertinent findings to report   Objective:   /92   Pulse 100   Temp 36.6 °C (97.8 °F) (Temporal)   Resp 18   Ht 1.6 m (5' 3\")   Wt 73.5 kg (162 lb)   SpO2 100%   BMI 28.70 kg/m²   Physical Exam   Constitutional: She appears well-developed and well-nourished.  Non-toxic appearance. She does not have a sickly appearance. She does not appear ill. No distress.   HENT:   Head: Normocephalic and atraumatic.   Right Ear: Tympanic membrane, external ear and ear canal normal.   Left Ear: External ear and ear canal normal. Tympanic membrane is injected and bulging.   Nose: Right sinus exhibits maxillary sinus tenderness. Left sinus exhibits maxillary sinus tenderness.   Mouth/Throat: Uvula is midline, oropharynx is clear and moist and mucous membranes are normal. No tonsillar exudate.   Moderate congestion.  Occasional cough throughout exam.   Eyes: Conjunctivae are normal.   Neck: Neck supple.   Cardiovascular: Normal rate, regular rhythm and normal heart sounds.  Exam reveals no gallop and no friction rub.    No murmur heard.  Pulmonary/Chest: Effort normal and breath sounds normal. No respiratory distress. She has no decreased breath sounds. She has no wheezes. She has no rhonchi. She has no rales.   Neurological: She is alert.   Skin: Skin is warm, dry and intact. Capillary refill takes less than 2 seconds. She is not diaphoretic.   Psychiatric: She has a " normal mood and affect. Her behavior is normal. Thought content normal.   Vitals reviewed.        Assessment/Plan:   1. Acute non-recurrent maxillary sinusitis  - amoxicillin-clavulanate (AUGMENTIN) 875-125 MG Tab; Take 1 Tab by mouth 2 times a day for 10 days.  Dispense: 20 Tab; Refill: 0  - fluticasone (FLONASE) 50 MCG/ACT nasal spray; Spray 1 Spray in nose every day.  Dispense: 16 g; Refill: 0    2. Acute otitis media, unspecified otitis media type    Physical exam consistent with maxillary sinusitis as well as developing left-sided acute otitis media.  I will treat with antibiotics.    Pt instructed to complete full course of medication despite symptomatic improvement. Pt to take med with meals to alleviate GI upset. Pt to take a probiotic or eat Tamiko’s yogurt/ kefir while taking the medication.    Flonase 1 squirt in each nostril, as instructed in clinic, once a day.  Use nasal saline TID to promote drainage.   Salt water gurgles to soothe sore throat.  Ayr saline gel to moisturize nasal passage and prevent bleeding.  Try to use sudafed sparingly in order to allow sinuses to drain.  Avoid the longer formulations and try to take only in the morning and/or at noon if needed.  If you fail to improve in 3-5 days or symptoms worsen/new symptoms develop, RTC for reevaluation    Differential diagnosis, natural history, supportive care, and indications for immediate follow-up discussed.

## 2019-03-05 ENCOUNTER — OFFICE VISIT (OUTPATIENT)
Dept: URGENT CARE | Facility: PHYSICIAN GROUP | Age: 59
End: 2019-03-05
Payer: COMMERCIAL

## 2019-03-05 VITALS
RESPIRATION RATE: 16 BRPM | HEART RATE: 78 BPM | DIASTOLIC BLOOD PRESSURE: 74 MMHG | TEMPERATURE: 97.9 F | OXYGEN SATURATION: 95 % | WEIGHT: 164 LBS | SYSTOLIC BLOOD PRESSURE: 132 MMHG | BODY MASS INDEX: 29.06 KG/M2 | HEIGHT: 63 IN

## 2019-03-05 DIAGNOSIS — R05.9 COUGH: ICD-10-CM

## 2019-03-05 DIAGNOSIS — J01.00 ACUTE MAXILLARY SINUSITIS, RECURRENCE NOT SPECIFIED: ICD-10-CM

## 2019-03-05 PROCEDURE — 99214 OFFICE O/P EST MOD 30 MIN: CPT | Performed by: NURSE PRACTITIONER

## 2019-03-05 RX ORDER — AMOXICILLIN AND CLAVULANATE POTASSIUM 875; 125 MG/1; MG/1
1 TABLET, FILM COATED ORAL 2 TIMES DAILY
Qty: 20 TAB | Refills: 0 | Status: SHIPPED | OUTPATIENT
Start: 2019-03-05 | End: 2019-03-15

## 2019-03-05 RX ORDER — BENZONATATE 200 MG/1
200 CAPSULE ORAL 3 TIMES DAILY PRN
Qty: 60 CAP | Refills: 0 | Status: SHIPPED | OUTPATIENT
Start: 2019-03-05 | End: 2019-06-12

## 2019-03-05 ASSESSMENT — ENCOUNTER SYMPTOMS
CHILLS: 1
SINUS PRESSURE: 1
SINUS PAIN: 1
COUGH: 1
SORE THROAT: 1
SPUTUM PRODUCTION: 1

## 2019-03-06 NOTE — PROGRESS NOTES
Subjective:      Laya Barnes is a 58 y.o. female who presents with Cough (cough, sinus congestion, chest congestion x4 days)    Past Medical History:   Diagnosis Date   • Bilateral foot pain 11/9/2015   • Colon polyps 6/11    Tubular Adenoma   • Flying phobia 11/4/2011   • Goiter    • Hand eczema 8/4/2014   • HTN (hypertension) 11/4/2011   • Hyperlipidemia LDL goal < 130 6/29/2012   • Hypertension    • Hypertriglyceridemia    • Migraine      Social History     Social History   • Marital status:      Spouse name: N/A   • Number of children: N/A   • Years of education: N/A     Occupational History   • Teacher [employer]     Social History Main Topics   • Smoking status: Never Smoker   • Smokeless tobacco: Never Used   • Alcohol use Yes      Comment: Very Rare   • Drug use: No   • Sexual activity: Yes     Partners: Male     Birth control/ protection: Post-Menopausal     Other Topics Concern   • Not on file     Social History Narrative    Patient is  and works full-time as an  at [employer]     Family History   Problem Relation Age of Onset   • Arthritis Mother         Osteoporosis   • Lung Disease Mother         COPD, smoker   • Heart Disease Mother         Arrythmia   • Hypertension Mother    • Hyperlipidemia Mother    • Heart Disease Maternal Grandfather         MI at 54   • Stroke Brother    • Hyperlipidemia Brother    • Arthritis Brother         fibromyalgia   • Heart Disease Father    • Lung Disease Father         COPD lung cancer    • Diabetes Maternal Aunt    • Stroke Maternal Aunt    • Diabetes Paternal Grandmother    • Stroke Maternal Uncle    • Hypertension Unknown        Allergies: Patient has no known allergies.    Patient presents with maxillary sinus pain and pressure.  Also green and yellow drainage that she is coughing up.  No shortness of breath or difficulty breathing.        Sinus Problem   This is a new problem. The current episode  "started in the past 7 days. The problem is unchanged. There has been no fever. Associated symptoms include chills, congestion, coughing, sinus pressure and a sore throat. Past treatments include nothing. The treatment provided no relief.       Review of Systems   Constitutional: Positive for chills and malaise/fatigue.   HENT: Positive for congestion, sinus pain, sinus pressure and sore throat.    Respiratory: Positive for cough and sputum production.    Skin: Negative.    All other systems reviewed and are negative.         Objective:     /74 (BP Location: Left arm, Patient Position: Sitting, BP Cuff Size: Small adult)   Pulse 78   Temp 36.6 °C (97.9 °F) (Temporal)   Resp 16   Ht 1.6 m (5' 3\")   Wt 74.4 kg (164 lb)   SpO2 95%   BMI 29.05 kg/m²      Physical Exam   Constitutional: She is oriented to person, place, and time. She appears well-developed and well-nourished.   HENT:   Head: Normocephalic.   Right Ear: External ear normal.   Left Ear: External ear normal.   Nose: Nose normal.   Mouth/Throat: No oropharyngeal exudate.   Tender over the maxillary sinuses    Eyes: Pupils are equal, round, and reactive to light. Conjunctivae and EOM are normal.   Neck: Normal range of motion. Neck supple.   Cardiovascular: Normal rate and regular rhythm.    Pulmonary/Chest: Effort normal and breath sounds normal.   Musculoskeletal: Normal range of motion.   Neurological: She is alert and oriented to person, place, and time.   Skin: Skin is warm and dry.   Psychiatric: She has a normal mood and affect.   Vitals reviewed.              Assessment/Plan:   Sinusitis     Humidifier  Push fluids  flonase  Repeat augmentin course  Continue probiotics  Consider follow up with PCP or ENT for persistent symptoms.    there are no diagnoses linked to this encounter.      "

## 2019-06-12 ENCOUNTER — OFFICE VISIT (OUTPATIENT)
Dept: MEDICAL GROUP | Facility: PHYSICIAN GROUP | Age: 59
End: 2019-06-12
Payer: COMMERCIAL

## 2019-06-12 VITALS
HEIGHT: 63 IN | DIASTOLIC BLOOD PRESSURE: 90 MMHG | SYSTOLIC BLOOD PRESSURE: 128 MMHG | RESPIRATION RATE: 14 BRPM | BODY MASS INDEX: 30.48 KG/M2 | WEIGHT: 172 LBS | TEMPERATURE: 98.8 F | HEART RATE: 91 BPM | OXYGEN SATURATION: 97 %

## 2019-06-12 DIAGNOSIS — N81.10 FEMALE BLADDER PROLAPSE: ICD-10-CM

## 2019-06-12 DIAGNOSIS — G43.109 MIGRAINE WITH AURA AND WITHOUT STATUS MIGRAINOSUS, NOT INTRACTABLE: ICD-10-CM

## 2019-06-12 DIAGNOSIS — Z12.39 SCREENING FOR BREAST CANCER: ICD-10-CM

## 2019-06-12 DIAGNOSIS — L57.8 SUN-DAMAGED SKIN: ICD-10-CM

## 2019-06-12 DIAGNOSIS — Z00.00 PREVENTATIVE HEALTH CARE: ICD-10-CM

## 2019-06-12 DIAGNOSIS — E78.5 HYPERLIPIDEMIA WITH TARGET LDL LESS THAN 130: ICD-10-CM

## 2019-06-12 PROCEDURE — 99386 PREV VISIT NEW AGE 40-64: CPT | Performed by: NURSE PRACTITIONER

## 2019-06-12 RX ORDER — TRIAMCINOLONE ACETONIDE 1 MG/G
1 OINTMENT TOPICAL 2 TIMES DAILY
Qty: 3 TUBE | Refills: 3 | Status: SHIPPED | OUTPATIENT
Start: 2019-06-12 | End: 2020-06-09 | Stop reason: SDUPTHER

## 2019-06-12 ASSESSMENT — PATIENT HEALTH QUESTIONNAIRE - PHQ9
SUM OF ALL RESPONSES TO PHQ QUESTIONS 1-9: 4
5. POOR APPETITE OR OVEREATING: 0 - NOT AT ALL
CLINICAL INTERPRETATION OF PHQ2 SCORE: 2

## 2019-06-12 NOTE — PROGRESS NOTES
"Chief Complaint   Patient presents with   • Annual Exam       Subjective:   Laya Barnes is a 59 y.o. female here today for evaluation and management of:    Female bladder prolapse  Seeing Dr. Toth for this.  Has upcoming appointment for follow up and PAP.     Migraine with aura and without status migrainosus, not intractable  Stable, rare migraine reported.     Sun-damaged skin  Reports some allergy to sun.  Uses triamcionolone as needed for itchy rash.      Hyperlipidemia with target LDL less than 130  Last level showed increased cholesterol and ldl.  Due for labs.            Current medicines (including changes today)  Current Outpatient Prescriptions   Medication Sig Dispense Refill   • triamcinolone acetonide (KENALOG) 0.1 % Ointment Apply 1 Application to affected area(s) 2 times a day. 3 Tube 3   • Calcium Carbonate-Vitamin D (CALCIUM + D PO) Take  by mouth every day.     • fluticasone (FLONASE) 50 MCG/ACT nasal spray Spray 1 Spray in nose every day. (Patient not taking: Reported on 3/5/2019) 16 g 0   • ibuprofen (MOTRIN) 800 MG Tab Take 1 Tab by mouth every 8 hours as needed for Inflammation. (Patient not taking: Reported on 2/19/2019) 60 Tab 1   • docosahexanoic acid (OMEGA 3 FA) 1000 MG CAPS Take 1,000 mg by mouth 3 times a day, with meals.       No current facility-administered medications for this visit.      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    ROS as stated in hpi  No chest pain, no shortness of breath, no abdominal pain       Objective:     /90 (BP Location: Left arm, Patient Position: Sitting)   Pulse 91   Temp 37.1 °C (98.8 °F) (Temporal)   Resp 14   Ht 1.6 m (5' 3\")   Wt 78 kg (172 lb)   SpO2 97%  Body mass index is 30.47 kg/m². stable. " Slightly elevated, but patient has been crying as she was talking about recent death of her mother.   Physical Exam:  Constitutional: Alert, no distress.  Skin: Warm, dry, good turgor,no cyanosis, no rashes in visible areas.  Eye: Equal, round and reactive, conjunctiva clear, lids normal.  Ears: No tenderness, no discharge.  External canals are without any significant edema or erythema. .  Gross auditory acuity is intact.  Nose: symmetrical without tenderness, no discharge.  Mouth/Throat: lips without lesion.  Oropharynx clear.  Neck: Trachea midline, no masses, no obvious thyroid enlargement Range of motion within normal limits.  Neuro: Cranial nerves 2-12 grossly intact.  No sensory deficit.  Respiratory: Unlabored respiratory effort, lungs clear to auscultation, no wheezes, no ronchi.  Cardiovascular: Normal S1, S2, no murmur, no edema.  Abdomen: Soft, non-tender, no masses, no guarding,  no hepatosplenomegaly.  Psych: Alert and oriented x3, normal affect and mood and judgement.        Assessment and Plan:   The following treatment plan was discussed    1. Female bladder prolapse  Chronic, ongoing. Stable.  Followed by Dr. Toth.  Request records/pap    2. Screening for breast cancer  Due for mammogram.  Order provided.  Monitor and follow.   - MA-SCREEN MAMMO W/CAD-BILAT; Future    3. Migraine with aura and without status migrainosus, not intractable  Chronic, ongoing. Stable.     4. Preventative health care  Due for labs.  Orders provided.  Monitor.   - CBC WITH DIFFERENTIAL; Future  - Comp Metabolic Panel; Future  - Lipid Profile; Future  - TSH; Future  - VITAMIN D,25 HYDROXY; Future    5. Sun-damaged skin  Chronic, ongoing. Encouraged yearly derm exam.      6. Hyperlipidemia with target LDL less than 130  Chronic, ongoing. Due for labs.  If LDL is still elevated, will start statin.  Discussed at length today.  Patient agrees.       Followup: Return in about 1 year (around 6/12/2020) for Annual.          Educated in proper administration of medication(s) ordered today including safety, possible SE, risks, benefits, rationale and alternatives to therapy.     Please note that this dictation was created using voice recognition software. I have made every reasonable attempt to correct obvious errors, but I expect that there are errors of grammar and possibly content that I did not discover before finalizing the note.

## 2019-06-19 ENCOUNTER — HOSPITAL ENCOUNTER (OUTPATIENT)
Dept: LAB | Facility: MEDICAL CENTER | Age: 59
End: 2019-06-19
Attending: NURSE PRACTITIONER
Payer: COMMERCIAL

## 2019-06-19 DIAGNOSIS — Z00.00 PREVENTATIVE HEALTH CARE: ICD-10-CM

## 2019-06-19 LAB
25(OH)D3 SERPL-MCNC: 12 NG/ML (ref 30–100)
ALBUMIN SERPL BCP-MCNC: 4.3 G/DL (ref 3.2–4.9)
ALBUMIN/GLOB SERPL: 1.4 G/DL
ALP SERPL-CCNC: 78 U/L (ref 30–99)
ALT SERPL-CCNC: 12 U/L (ref 2–50)
ANION GAP SERPL CALC-SCNC: 7 MMOL/L (ref 0–11.9)
AST SERPL-CCNC: 16 U/L (ref 12–45)
BASOPHILS # BLD AUTO: 0.6 % (ref 0–1.8)
BASOPHILS # BLD: 0.03 K/UL (ref 0–0.12)
BILIRUB SERPL-MCNC: 0.3 MG/DL (ref 0.1–1.5)
BUN SERPL-MCNC: 12 MG/DL (ref 8–22)
CALCIUM SERPL-MCNC: 9.4 MG/DL (ref 8.5–10.5)
CHLORIDE SERPL-SCNC: 107 MMOL/L (ref 96–112)
CHOLEST SERPL-MCNC: 254 MG/DL (ref 100–199)
CO2 SERPL-SCNC: 25 MMOL/L (ref 20–33)
CREAT SERPL-MCNC: 1.01 MG/DL (ref 0.5–1.4)
EOSINOPHIL # BLD AUTO: 0.17 K/UL (ref 0–0.51)
EOSINOPHIL NFR BLD: 3.5 % (ref 0–6.9)
ERYTHROCYTE [DISTWIDTH] IN BLOOD BY AUTOMATED COUNT: 43 FL (ref 35.9–50)
GLOBULIN SER CALC-MCNC: 3 G/DL (ref 1.9–3.5)
GLUCOSE SERPL-MCNC: 97 MG/DL (ref 65–99)
HCT VFR BLD AUTO: 45.2 % (ref 37–47)
HDLC SERPL-MCNC: 42 MG/DL
HGB BLD-MCNC: 14 G/DL (ref 12–16)
IMM GRANULOCYTES # BLD AUTO: 0.01 K/UL (ref 0–0.11)
IMM GRANULOCYTES NFR BLD AUTO: 0.2 % (ref 0–0.9)
LDLC SERPL CALC-MCNC: 161 MG/DL
LYMPHOCYTES # BLD AUTO: 1.47 K/UL (ref 1–4.8)
LYMPHOCYTES NFR BLD: 30.5 % (ref 22–41)
MCH RBC QN AUTO: 27.6 PG (ref 27–33)
MCHC RBC AUTO-ENTMCNC: 31 G/DL (ref 33.6–35)
MCV RBC AUTO: 89.2 FL (ref 81.4–97.8)
MONOCYTES # BLD AUTO: 0.32 K/UL (ref 0–0.85)
MONOCYTES NFR BLD AUTO: 6.6 % (ref 0–13.4)
NEUTROPHILS # BLD AUTO: 2.82 K/UL (ref 2–7.15)
NEUTROPHILS NFR BLD: 58.6 % (ref 44–72)
NRBC # BLD AUTO: 0 K/UL
NRBC BLD-RTO: 0 /100 WBC
PLATELET # BLD AUTO: 274 K/UL (ref 164–446)
PMV BLD AUTO: 10.3 FL (ref 9–12.9)
POTASSIUM SERPL-SCNC: 4.4 MMOL/L (ref 3.6–5.5)
PROT SERPL-MCNC: 7.3 G/DL (ref 6–8.2)
RBC # BLD AUTO: 5.07 M/UL (ref 4.2–5.4)
SODIUM SERPL-SCNC: 139 MMOL/L (ref 135–145)
TRIGL SERPL-MCNC: 253 MG/DL (ref 0–149)
TSH SERPL DL<=0.005 MIU/L-ACNC: 2.82 UIU/ML (ref 0.38–5.33)
WBC # BLD AUTO: 4.8 K/UL (ref 4.8–10.8)

## 2019-06-19 PROCEDURE — 84443 ASSAY THYROID STIM HORMONE: CPT

## 2019-06-19 PROCEDURE — 85025 COMPLETE CBC W/AUTO DIFF WBC: CPT

## 2019-06-19 PROCEDURE — 80053 COMPREHEN METABOLIC PANEL: CPT

## 2019-06-19 PROCEDURE — 80061 LIPID PANEL: CPT

## 2019-06-19 PROCEDURE — 82306 VITAMIN D 25 HYDROXY: CPT

## 2019-06-19 PROCEDURE — 36415 COLL VENOUS BLD VENIPUNCTURE: CPT

## 2019-06-20 ENCOUNTER — TELEPHONE (OUTPATIENT)
Dept: MEDICAL GROUP | Facility: PHYSICIAN GROUP | Age: 59
End: 2019-06-20

## 2019-06-20 NOTE — TELEPHONE ENCOUNTER
Phone Number Called: [phone] (home)     Call outcome: left message for patient to call back regarding message below    Message: Left a voicemail advising Pt her results are in mychart any question to call.

## 2019-06-20 NOTE — TELEPHONE ENCOUNTER
----- Message from DINO Johnson sent at 6/20/2019 12:50 PM PDT -----  Stephie  Thanks for getting labs completed.  Your kidney and liver function normal,  Thyroid levels normal.  Cholesterol and triglycerides are elevated.  We recommend decreasing saturated fat which are found in meats that come from a cow or pig, and found in creams, cheeses, butter, ovalle, and fried foods. We recommend more vegetables, fruits, fish, nuts, olive oil and exercising 5 times a week for 30 minutes.  Vitamin D is also low at 12.  I would like you to add 5000 units of Vitamin D3 daily.    Take care  DINO Johnson

## 2019-06-21 ENCOUNTER — HOSPITAL ENCOUNTER (OUTPATIENT)
Dept: RADIOLOGY | Facility: MEDICAL CENTER | Age: 59
End: 2019-06-21
Attending: NURSE PRACTITIONER
Payer: COMMERCIAL

## 2019-06-21 DIAGNOSIS — Z12.31 VISIT FOR SCREENING MAMMOGRAM: ICD-10-CM

## 2019-06-21 PROCEDURE — 77063 BREAST TOMOSYNTHESIS BI: CPT

## 2019-12-19 ENCOUNTER — OFFICE VISIT (OUTPATIENT)
Dept: URGENT CARE | Facility: PHYSICIAN GROUP | Age: 59
End: 2019-12-19
Payer: COMMERCIAL

## 2019-12-19 VITALS
HEIGHT: 63 IN | OXYGEN SATURATION: 96 % | BODY MASS INDEX: 30.3 KG/M2 | RESPIRATION RATE: 16 BRPM | DIASTOLIC BLOOD PRESSURE: 72 MMHG | TEMPERATURE: 98 F | SYSTOLIC BLOOD PRESSURE: 118 MMHG | WEIGHT: 171 LBS | HEART RATE: 74 BPM

## 2019-12-19 DIAGNOSIS — J01.00 ACUTE NON-RECURRENT MAXILLARY SINUSITIS: ICD-10-CM

## 2019-12-19 PROCEDURE — 99214 OFFICE O/P EST MOD 30 MIN: CPT | Performed by: PHYSICIAN ASSISTANT

## 2019-12-19 RX ORDER — FLUTICASONE PROPIONATE 50 MCG
1 SPRAY, SUSPENSION (ML) NASAL DAILY
Qty: 16 G | Refills: 0 | Status: SHIPPED | OUTPATIENT
Start: 2019-12-19 | End: 2021-04-22

## 2019-12-19 RX ORDER — AMOXICILLIN AND CLAVULANATE POTASSIUM 875; 125 MG/1; MG/1
1 TABLET, FILM COATED ORAL 2 TIMES DAILY
Qty: 14 TAB | Refills: 0 | Status: SHIPPED | OUTPATIENT
Start: 2019-12-19 | End: 2019-12-26

## 2019-12-19 ASSESSMENT — ENCOUNTER SYMPTOMS
SORE THROAT: 0
COUGH: 1
VOMITING: 0
FEVER: 0
SINUS PAIN: 1
NAUSEA: 0
ABDOMINAL PAIN: 0
CHILLS: 0

## 2019-12-20 NOTE — PATIENT INSTRUCTIONS
Sinusitis    Start antibiotics today.  Take ibuprofen or naproxen and prescribed medicine for pain.  Return for ill appearance, shortness of breath or inability to swallow.  Use over the counter decongestants.  See a doctor if not better in 4 days.      You have an acute (sudden) sinus infection. This is called sinusitis.    These infections commonly result from obstruction of the openings that drain your sinuses. Sinuses are air pockets within the bones of your face. This blockage results in your sinuses' inability to drain. This leads to infection.    There will be different areas of pain depending on which sinuses have become infected. The infected sinus may have overlying areas of pain.  The maxillary sinuses often produce pain beneath the eyes. Frontal sinusitis causes pain in the middle of the forehead and above the eyes. Other symptoms (problems) are back upper toothaches and purulent (colored, pus-like) discharge from the nose. Any inflammation (soreness), warmth, or tenderness over these same areas are signs of infection.    Your caregiver gave you antibiotics. These are medications that kill germs. You may also have been given a decongestant. This is most often determined by an exam. If x-rays have been taken, make sure you obtain your results or find out how you are to obtain them.     Take ibuprofen (Advil® or Motrin®), acetaminophen (Tylenol®), or both if you develop chills or fever. Ask your caregiver about over-the-counter medications you may be taking and if you should continue them. Should you develop other problems not relieved by your medications, see your caregiver or visit the Emergency Department.    [employer]® Patient Information ©2007 [employer].

## 2019-12-20 NOTE — PROGRESS NOTES
"Subjective:      Laya Barnes is a 59 y.o. female who presents with Nasal Congestion (Nasal congestion/pressure, headaches, mild cough, dry throat x2weeks )          Sinusitis   This is a new problem. The current episode started 1 to 4 weeks ago. The problem has been gradually worsening since onset. There has been no fever. Associated symptoms include congestion and coughing. Pertinent negatives include no chills, ear pain or sore throat. Past treatments include saline sprays and oral decongestants. The treatment provided mild relief.       Review of Systems   Constitutional: Negative for chills and fever.   HENT: Positive for congestion and sinus pain. Negative for ear discharge, ear pain and sore throat.    Respiratory: Positive for cough.    Cardiovascular: Negative for chest pain.   Gastrointestinal: Negative for abdominal pain, nausea and vomiting.     Past Medical History, Family, Social History Reviewed  Allergies and Medications reviewed.         Objective:     /72   Pulse 74   Temp 36.7 °C (98 °F) (Temporal)   Resp 16   Ht 1.6 m (5' 3\")   Wt 77.6 kg (171 lb)   SpO2 96%   BMI 30.29 kg/m²      Physical Exam  Constitutional:       General: She is not in acute distress.     Appearance: Normal appearance.   HENT:      Right Ear: Tympanic membrane normal.      Left Ear: Tympanic membrane normal.      Nose: Mucosal edema and rhinorrhea present.      Right Sinus: Maxillary sinus tenderness present.      Left Sinus: Maxillary sinus tenderness present.      Mouth/Throat:      Mouth: Mucous membranes are moist.      Pharynx: Oropharynx is clear. No posterior oropharyngeal erythema.   Eyes:      Extraocular Movements: Extraocular movements intact.      Conjunctiva/sclera: Conjunctivae normal.      Pupils: Pupils are equal, round, and reactive to light.   Cardiovascular:      Rate and Rhythm: Normal rate and regular rhythm.   Pulmonary:      Effort: Pulmonary effort is normal.      Breath sounds: " Normal breath sounds.   Neurological:      Mental Status: She is alert.            Assessment/Plan:     1. Acute non-recurrent maxillary sinusitis  amoxicillin-clavulanate (AUGMENTIN) 875-125 MG Tab    fluticasone (FLONASE) 50 MCG/ACT nasal spray     Return if symptoms worsen or fail to improve, for Short.  Patient understood and agreed to plan of care.

## 2020-06-09 ENCOUNTER — OFFICE VISIT (OUTPATIENT)
Dept: MEDICAL GROUP | Facility: PHYSICIAN GROUP | Age: 60
End: 2020-06-09
Payer: COMMERCIAL

## 2020-06-09 VITALS
HEIGHT: 63 IN | HEART RATE: 96 BPM | BODY MASS INDEX: 31.18 KG/M2 | TEMPERATURE: 98.2 F | DIASTOLIC BLOOD PRESSURE: 82 MMHG | SYSTOLIC BLOOD PRESSURE: 122 MMHG | WEIGHT: 176 LBS | OXYGEN SATURATION: 98 % | RESPIRATION RATE: 16 BRPM

## 2020-06-09 DIAGNOSIS — Z12.39 SCREENING FOR BREAST CANCER: ICD-10-CM

## 2020-06-09 DIAGNOSIS — E04.9 GOITER: ICD-10-CM

## 2020-06-09 DIAGNOSIS — E78.5 HYPERLIPIDEMIA WITH TARGET LDL LESS THAN 130: ICD-10-CM

## 2020-06-09 DIAGNOSIS — N81.10 FEMALE BLADDER PROLAPSE: ICD-10-CM

## 2020-06-09 DIAGNOSIS — L57.8 SUN-DAMAGED SKIN: ICD-10-CM

## 2020-06-09 DIAGNOSIS — I10 ESSENTIAL HYPERTENSION: ICD-10-CM

## 2020-06-09 PROBLEM — G89.29 CHRONIC THUMB PAIN, RIGHT: Status: RESOLVED | Noted: 2018-04-30 | Resolved: 2020-06-09

## 2020-06-09 PROBLEM — M79.644 CHRONIC THUMB PAIN, RIGHT: Status: RESOLVED | Noted: 2018-04-30 | Resolved: 2020-06-09

## 2020-06-09 PROBLEM — M79.671 RIGHT FOOT PAIN: Status: RESOLVED | Noted: 2017-05-24 | Resolved: 2020-06-09

## 2020-06-09 PROCEDURE — 99386 PREV VISIT NEW AGE 40-64: CPT | Performed by: NURSE PRACTITIONER

## 2020-06-09 RX ORDER — TRIAMCINOLONE ACETONIDE 1 MG/G
1 OINTMENT TOPICAL 2 TIMES DAILY
Qty: 3 TUBE | Refills: 3 | Status: SHIPPED | OUTPATIENT
Start: 2020-06-09 | End: 2023-05-05 | Stop reason: SDUPTHER

## 2020-06-09 ASSESSMENT — FIBROSIS 4 INDEX: FIB4 SCORE: 1.01

## 2020-06-09 ASSESSMENT — PATIENT HEALTH QUESTIONNAIRE - PHQ9
SUM OF ALL RESPONSES TO PHQ QUESTIONS 1-9: 2
5. POOR APPETITE OR OVEREATING: 0 - NOT AT ALL
CLINICAL INTERPRETATION OF PHQ2 SCORE: 1

## 2020-06-09 NOTE — PROGRESS NOTES
Subjective:     CC:   Chief Complaint   Patient presents with   • Annual Exam       HPI:   Laya Barnes is a 60 y.o. female who presents for annual exam. She is feeling well and denies any complaints.    Patient has GYN provider: yes  Last pap: request records, dr chaney  Last mammo: due this month  Last colonoscopy: due later this year  Last bone density test: up to date  Last Tdap: up to date  Gardiasil: n/a/  Hx. STD's: n/a/  Birth control: post menopausal      No significant bloating/fluid retention, pelvic pain, or dyspareunia. No vaginal discharge  No breast tenderness, mass, nipple discharge, changes in size or contour, or abnormal cyclic discomfort.  She does perform regular self breast exams.  Regular exercise: yes   Diet: pop gutierrez    She  has a past medical history of Bilateral foot pain (2015), Colon polyps (), Flying phobia (2011), Goiter, Hand eczema (2014), HTN (hypertension) (2011), Hyperlipidemia LDL goal < 130 (2012), Hypertension, Hypertriglyceridemia, and Migraine.  She  has a past surgical history that includes tonsillectomy and adenoidectomy; colonoscopy (); pr  delivery only (); mass excision ortho (2013); and tendon repair (2013).    Family History   Problem Relation Age of Onset   • Arthritis Mother         Osteoporosis   • Lung Disease Mother         COPD, smoker   • Heart Disease Mother         Arrythmia   • Hypertension Mother    • Hyperlipidemia Mother    • Heart Disease Maternal Grandfather         MI at 54   • Stroke Brother    • Hyperlipidemia Brother    • Arthritis Brother         fibromyalgia   • Heart Disease Father    • Lung Disease Father         COPD lung cancer    • Diabetes Maternal Aunt    • Stroke Maternal Aunt    • Diabetes Paternal Grandmother    • Stroke Maternal Uncle    • Hypertension Unknown        Social History     Socioeconomic History   • Marital status:      Spouse name: Not on file   • Number of  children: Not on file   • Years of education: Not on file   • Highest education level: Not on file   Occupational History   • Occupation: Teacher     Employer: [employer]   Social Needs   • Financial resource strain: Not on file   • Food insecurity     Worry: Not on file     Inability: Not on file   • Transportation needs     Medical: Not on file     Non-medical: Not on file   Tobacco Use   • Smoking status: Never Smoker   • Smokeless tobacco: Never Used   Substance and Sexual Activity   • Alcohol use: Yes     Comment: Very Rare   • Drug use: No   • Sexual activity: Yes     Partners: Male     Birth control/protection: Post-Menopausal   Lifestyle   • Physical activity     Days per week: Not on file     Minutes per session: Not on file   • Stress: Not on file   Relationships   • Social connections     Talks on phone: Not on file     Gets together: Not on file     Attends Spiritism service: Not on file     Active member of club or organization: Not on file     Attends meetings of clubs or organizations: Not on file     Relationship status: Not on file   • Intimate partner violence     Fear of current or ex partner: Not on file     Emotionally abused: Not on file     Physically abused: Not on file     Forced sexual activity: Not on file   Other Topics Concern   • Not on file   Social History Narrative    Patient is  and works full-time as an  at [employer]       Patient Active Problem List    Diagnosis Date Noted   • Sun-damaged skin 04/30/2018   • Female bladder prolapse 06/16/2016   • DDD (degenerative disc disease), lumbar 07/24/2015   • DDD (degenerative disc disease), cervical 02/27/2015   • Hyperlipidemia with target LDL less than 130 06/29/2012   • Colon polyps 04/06/2012   • HTN (hypertension) 11/04/2011   • Goiter    • Migraine with aura and without status migrainosus, not intractable          Current Outpatient Medications   Medication Sig Dispense Refill   •  "Cholecalciferol (VITAMIN D3 PO) Take  by mouth.     • triamcinolone acetonide (KENALOG) 0.1 % Ointment Apply 1 Application to affected area(s) 2 times a day. 3 Tube 3   • fluticasone (FLONASE) 50 MCG/ACT nasal spray Spray 1 Spray in nose every day. 16 g 0   • ibuprofen (MOTRIN) 800 MG Tab Take 1 Tab by mouth every 8 hours as needed for Inflammation. 60 Tab 1   • docosahexanoic acid (OMEGA 3 FA) 1000 MG CAPS Take 1,000 mg by mouth 3 times a day, with meals.     • Calcium Carbonate-Vitamin D (CALCIUM + D PO) Take  by mouth every day.       No current facility-administered medications for this visit.      No Known Allergies    Review of Systems   Constitutional: Negative for fever, chills and malaise/fatigue.   HENT: Negative for congestion.    Eyes: Negative for pain.   Respiratory: Negative for cough and shortness of breath.    Cardiovascular: Negative for leg swelling.   Gastrointestinal: Negative for nausea, vomiting, abdominal pain and diarrhea.   Genitourinary: Negative for dysuria and hematuria.   Skin: Negative for rash.   Neurological: Negative for dizziness, focal weakness and headaches.   Endo/Heme/Allergies: Does not bruise/bleed easily.   Psychiatric/Behavioral: Negative for depression.  The patient is not nervous/anxious.      Objective:     /82 (BP Location: Left arm, Patient Position: Sitting)   Pulse 96   Temp 36.8 °C (98.2 °F) (Temporal)   Resp 16   Ht 1.6 m (5' 3\")   Wt 79.8 kg (176 lb)   SpO2 98%   BMI 31.18 kg/m²   Body mass index is 31.18 kg/m².  Wt Readings from Last 4 Encounters:   06/09/20 79.8 kg (176 lb)   12/19/19 77.6 kg (171 lb)   06/12/19 78 kg (172 lb)   03/05/19 74.4 kg (164 lb)       Physical Exam:  Constitutional: Well-developed and well-nourished. Not diaphoretic. No distress.   Skin: Skin is warm and dry. No rash noted.  Head: Atraumatic without lesions.  Eyes: Conjunctivae and extraocular motions are normal. Pupils are equal, round, and reactive to light. No scleral " icterus.   Ears:  External ears unremarkable. Tympanic membranes clear and intact.  Nose: Nares patent. Septum midline. Turbinates without erythema nor edema. No discharge.   Mouth/Throat: Dentition is good. Tongue normal. Oropharynx is clear and moist. Posterior pharynx without erythema or exudates.  Neck: Supple, trachea midline. Normal range of motion. No thyromegaly present. No lymphadenopathy--cervical or supraclavicular.  Cardiovascular: Regular rate and rhythm, S1 and S2 without murmur, rubs, or gallops.     Abdomen: Soft, non tender, and without distention. Active bowel sounds in all four quadrants. No rebound, guarding, masses or HSM.  Extremities: No cyanosis, clubbing, erythema, nor edema. Distal pulses intact and symmetric.   Musculoskeletal: All major joints AROM full in all directions without pain.  Neurological: Alert and oriented x 3. DTRs 2+/3 and symmetric. No cranial nerve deficit. 5/5 myotomes. Sensation intact. Negative Rhomberg.  Psychiatric:  Behavior, mood, and affect are appropriate.    A chaperone was offered to the patient during today's exam. Patient declined chaperone.    Assessment and Plan:     1. Hyperlipidemia with target LDL less than 130  Lipid Profile   2. Essential hypertension  CBC WITH DIFFERENTIAL    Comp Metabolic Panel   3. Goiter  TSH    FREE THYROXINE    VITAMIN D,25 HYDROXY   4. Screening for breast cancer  MA-SCREENING MAMMO BILAT W/CAD   5. Female bladder prolapse     6. Sun-damaged skin  REFERRAL TO DERMATOLOGY       HCM:  Mammogram, calcium, vitamin D   Labs per orders  Immunizations per orders  Patient counseled about skin care, diet, supplements, prenatal vitamins, safe sex and exercise.    Follow-up: Return in about 1 year (around 6/9/2021) for Annual.

## 2020-06-09 NOTE — ASSESSMENT & PLAN NOTE
Results for NITIN KEARNEY (MRN [id number]) as of 6/9/2020 14:06   Ref. Range 6/19/2019 09:58   Cholesterol,Tot Latest Ref Range: 100 - 199 mg/dL 254 (H)   Triglycerides Latest Ref Range: 0 - 149 mg/dL 253 (H)   HDL Latest Ref Range: >=40 mg/dL 42   LDL Latest Ref Range: <100 mg/dL 161 (H)   Due for updated labs.  Exercising daily.  Yulissa gutierrez.  Strong family history.  Considering statin.

## 2020-06-09 NOTE — LETTER
[facility]  DINO Johnson  [address] [place] [address]  Fax: [phone]   Authorization for Release/Disclosure of   Protected Health Information   Name: LAYA BARNES : 1960 SSN: [id number]   Address: [address] [place] [address] [place] [address] Phone:    [phone] (home)    I authorize the entity listed below to release/disclose the PHI below to:   [facility]/DINO Johnson and DINO Johnson   Provider or Entity Name:  Angela Toth   Address   City, State, [facility]   Phone:      Fax:     Reason for request: continuity of care   Information to be released:    [  ] LAST COLONOSCOPY,  including any PATH REPORT and follow-up  [  ] LAST FIT/COLOGUARD RESULT [  ] LAST DEXA  [  ] LAST MAMMOGRAM  [ x ] LAST PAP  [  ] LAST LABS [  ] RETINA EXAM REPORT  [  ] IMMUNIZATION RECORDS  [  ] Release all info      [  ] Check here and initial the line next to each item to release ALL health information INCLUDING  _____ Care and treatment for drug and / or alcohol abuse  _____ HIV testing, infection status, or AIDS  _____ Genetic Testing    DATES OF SERVICE OR TIME PERIOD TO BE DISCLOSED: _____________  I understand and acknowledge that:  * This Authorization may be revoked at any time by you in writing, except if your health information has already been used or disclosed.  * Your health information that will be used or disclosed as a result of you signing this authorization could be re-disclosed by the recipient. If this occurs, your re-disclosed health information may no longer be protected by State or Federal laws.  * You may refuse to sign this Authorization. Your refusal will not affect your ability to obtain treatment.  * This Authorization becomes effective upon signing and will  on (date) __________.      If no date is indicated, this Authorization will  one (1) year from the signature date.    Name: Laya Barnes    Signature:   Date:      6/9/2020       PLEASE FAX REQUESTED RECORDS BACK TO: [phone]

## 2020-06-09 NOTE — ASSESSMENT & PLAN NOTE
bp 122/82 today.  Not currently on any medication.     Review Findings: no new or changing lesions Detail Level: Detailed

## 2020-06-09 NOTE — LETTER
[facility]  DINO Johnson  [address] [place] [address]  Fax: [phone]   Authorization for Release/Disclosure of   Protected Health Information   Name: LAYA BARNES : 1960 SSN: [id number]   Address: [address] [place] [address] [place] [address] Phone:    [phone] (home)    I authorize the entity listed below to release/disclose the PHI below to:   [facility]/DINO Johnson and DINO Johnson   Provider or Entity Name:  [facility]   Address   City, State, Zip   Phone:      Fax:     Reason for request: continuity of care   Information to be released:    [  ] LAST COLONOSCOPY,  including any PATH REPORT and follow-up  [  ] LAST FIT/COLOGUARD RESULT [  ] LAST DEXA  [  ] LAST MAMMOGRAM  [  ] LAST PAP  [  ] LAST LABS [  ] RETINA EXAM REPORT  [x  ] IMMUNIZATION RECORDS  [  ] Release all info      [  ] Check here and initial the line next to each item to release ALL health information INCLUDING  _____ Care and treatment for drug and / or alcohol abuse  _____ HIV testing, infection status, or AIDS  _____ Genetic Testing    DATES OF SERVICE OR TIME PERIOD TO BE DISCLOSED: _____________  I understand and acknowledge that:  * This Authorization may be revoked at any time by you in writing, except if your health information has already been used or disclosed.  * Your health information that will be used or disclosed as a result of you signing this authorization could be re-disclosed by the recipient. If this occurs, your re-disclosed health information may no longer be protected by State or Federal laws.  * You may refuse to sign this Authorization. Your refusal will not affect your ability to obtain treatment.  * This Authorization becomes effective upon signing and will  on (date) __________.      If no date is indicated, this Authorization will  one (1) year from the signature date.    Name: Laya Barnes    Signature:   Date:     2020            PLEASE FAX REQUESTED RECORDS BACK TO: [phone]

## 2020-06-18 ENCOUNTER — HOSPITAL ENCOUNTER (OUTPATIENT)
Dept: LAB | Facility: MEDICAL CENTER | Age: 60
End: 2020-06-18
Attending: NURSE PRACTITIONER
Payer: COMMERCIAL

## 2020-06-18 DIAGNOSIS — I10 ESSENTIAL HYPERTENSION: ICD-10-CM

## 2020-06-18 DIAGNOSIS — E04.9 GOITER: ICD-10-CM

## 2020-06-18 DIAGNOSIS — E78.5 HYPERLIPIDEMIA WITH TARGET LDL LESS THAN 130: ICD-10-CM

## 2020-06-18 LAB
25(OH)D3 SERPL-MCNC: 45 NG/ML (ref 30–100)
ALBUMIN SERPL BCP-MCNC: 4.5 G/DL (ref 3.2–4.9)
ALBUMIN/GLOB SERPL: 1.6 G/DL
ALP SERPL-CCNC: 73 U/L (ref 30–99)
ALT SERPL-CCNC: 13 U/L (ref 2–50)
ANION GAP SERPL CALC-SCNC: 12 MMOL/L (ref 7–16)
AST SERPL-CCNC: 16 U/L (ref 12–45)
BASOPHILS # BLD AUTO: 0.7 % (ref 0–1.8)
BASOPHILS # BLD: 0.04 K/UL (ref 0–0.12)
BILIRUB SERPL-MCNC: 0.2 MG/DL (ref 0.1–1.5)
BUN SERPL-MCNC: 17 MG/DL (ref 8–22)
CALCIUM SERPL-MCNC: 9.4 MG/DL (ref 8.5–10.5)
CHLORIDE SERPL-SCNC: 107 MMOL/L (ref 96–112)
CHOLEST SERPL-MCNC: 254 MG/DL (ref 100–199)
CO2 SERPL-SCNC: 23 MMOL/L (ref 20–33)
CREAT SERPL-MCNC: 0.98 MG/DL (ref 0.5–1.4)
EOSINOPHIL # BLD AUTO: 0.14 K/UL (ref 0–0.51)
EOSINOPHIL NFR BLD: 2.5 % (ref 0–6.9)
ERYTHROCYTE [DISTWIDTH] IN BLOOD BY AUTOMATED COUNT: 41.4 FL (ref 35.9–50)
FASTING STATUS PATIENT QL REPORTED: NORMAL
GLOBULIN SER CALC-MCNC: 2.8 G/DL (ref 1.9–3.5)
GLUCOSE SERPL-MCNC: 95 MG/DL (ref 65–99)
HCT VFR BLD AUTO: 43.5 % (ref 37–47)
HDLC SERPL-MCNC: 45 MG/DL
HGB BLD-MCNC: 13.6 G/DL (ref 12–16)
IMM GRANULOCYTES # BLD AUTO: 0.01 K/UL (ref 0–0.11)
IMM GRANULOCYTES NFR BLD AUTO: 0.2 % (ref 0–0.9)
LDLC SERPL CALC-MCNC: 163 MG/DL
LYMPHOCYTES # BLD AUTO: 1.68 K/UL (ref 1–4.8)
LYMPHOCYTES NFR BLD: 30.2 % (ref 22–41)
MCH RBC QN AUTO: 27.5 PG (ref 27–33)
MCHC RBC AUTO-ENTMCNC: 31.3 G/DL (ref 33.6–35)
MCV RBC AUTO: 87.9 FL (ref 81.4–97.8)
MONOCYTES # BLD AUTO: 0.43 K/UL (ref 0–0.85)
MONOCYTES NFR BLD AUTO: 7.7 % (ref 0–13.4)
NEUTROPHILS # BLD AUTO: 3.26 K/UL (ref 2–7.15)
NEUTROPHILS NFR BLD: 58.7 % (ref 44–72)
NRBC # BLD AUTO: 0 K/UL
NRBC BLD-RTO: 0 /100 WBC
PLATELET # BLD AUTO: 271 K/UL (ref 164–446)
PMV BLD AUTO: 9.9 FL (ref 9–12.9)
POTASSIUM SERPL-SCNC: 4.8 MMOL/L (ref 3.6–5.5)
PROT SERPL-MCNC: 7.3 G/DL (ref 6–8.2)
RBC # BLD AUTO: 4.95 M/UL (ref 4.2–5.4)
SODIUM SERPL-SCNC: 142 MMOL/L (ref 135–145)
T4 FREE SERPL-MCNC: 0.98 NG/DL (ref 0.93–1.7)
TRIGL SERPL-MCNC: 229 MG/DL (ref 0–149)
TSH SERPL DL<=0.005 MIU/L-ACNC: 3 UIU/ML (ref 0.38–5.33)
WBC # BLD AUTO: 5.6 K/UL (ref 4.8–10.8)

## 2020-06-18 PROCEDURE — 84439 ASSAY OF FREE THYROXINE: CPT

## 2020-06-18 PROCEDURE — 80061 LIPID PANEL: CPT

## 2020-06-18 PROCEDURE — 82306 VITAMIN D 25 HYDROXY: CPT

## 2020-06-18 PROCEDURE — 85025 COMPLETE CBC W/AUTO DIFF WBC: CPT

## 2020-06-18 PROCEDURE — 80053 COMPREHEN METABOLIC PANEL: CPT

## 2020-06-18 PROCEDURE — 36415 COLL VENOUS BLD VENIPUNCTURE: CPT

## 2020-06-18 PROCEDURE — 84443 ASSAY THYROID STIM HORMONE: CPT

## 2020-06-20 ENCOUNTER — PATIENT MESSAGE (OUTPATIENT)
Dept: MEDICAL GROUP | Facility: PHYSICIAN GROUP | Age: 60
End: 2020-06-20

## 2020-06-22 RX ORDER — ATORVASTATIN CALCIUM 40 MG/1
40 TABLET, FILM COATED ORAL DAILY
Qty: 90 TAB | Refills: 3 | Status: SHIPPED | OUTPATIENT
Start: 2020-06-22 | End: 2021-08-05

## 2020-07-10 ENCOUNTER — HOSPITAL ENCOUNTER (OUTPATIENT)
Dept: RADIOLOGY | Facility: MEDICAL CENTER | Age: 60
End: 2020-07-10
Attending: NURSE PRACTITIONER
Payer: COMMERCIAL

## 2020-07-10 DIAGNOSIS — Z12.31 VISIT FOR SCREENING MAMMOGRAM: ICD-10-CM

## 2020-07-10 PROCEDURE — 77067 SCR MAMMO BI INCL CAD: CPT

## 2020-08-12 ENCOUNTER — APPOINTMENT (RX ONLY)
Dept: URBAN - METROPOLITAN AREA CLINIC 22 | Facility: CLINIC | Age: 60
Setting detail: DERMATOLOGY
End: 2020-08-12

## 2020-08-12 DIAGNOSIS — L82.0 INFLAMED SEBORRHEIC KERATOSIS: ICD-10-CM

## 2020-08-12 DIAGNOSIS — L82.1 OTHER SEBORRHEIC KERATOSIS: ICD-10-CM

## 2020-08-12 DIAGNOSIS — Z71.89 OTHER SPECIFIED COUNSELING: ICD-10-CM

## 2020-08-12 DIAGNOSIS — D22 MELANOCYTIC NEVI: ICD-10-CM

## 2020-08-12 DIAGNOSIS — L72.0 EPIDERMAL CYST: ICD-10-CM

## 2020-08-12 DIAGNOSIS — L81.4 OTHER MELANIN HYPERPIGMENTATION: ICD-10-CM

## 2020-08-12 PROBLEM — D22.5 MELANOCYTIC NEVI OF TRUNK: Status: ACTIVE | Noted: 2020-08-12

## 2020-08-12 PROCEDURE — ? COUNSELING

## 2020-08-12 PROCEDURE — ? LIQUID NITROGEN

## 2020-08-12 PROCEDURE — 17110 DESTRUCTION B9 LES UP TO 14: CPT

## 2020-08-12 PROCEDURE — 99203 OFFICE O/P NEW LOW 30 MIN: CPT | Mod: 25

## 2020-08-12 ASSESSMENT — LOCATION DETAILED DESCRIPTION DERM
LOCATION DETAILED: LOWER STERNUM
LOCATION DETAILED: RIGHT VENTRAL PROXIMAL FOREARM
LOCATION DETAILED: SUBXIPHOID
LOCATION DETAILED: INFERIOR MID FOREHEAD
LOCATION DETAILED: RIGHT SUPERIOR MEDIAL MALAR CHEEK
LOCATION DETAILED: RIGHT INFERIOR CENTRAL MALAR CHEEK
LOCATION DETAILED: LEFT VENTRAL PROXIMAL FOREARM
LOCATION DETAILED: INFERIOR THORACIC SPINE
LOCATION DETAILED: SUPERIOR THORACIC SPINE

## 2020-08-12 ASSESSMENT — LOCATION ZONE DERM
LOCATION ZONE: FACE
LOCATION ZONE: TRUNK
LOCATION ZONE: ARM

## 2020-08-12 ASSESSMENT — LOCATION SIMPLE DESCRIPTION DERM
LOCATION SIMPLE: LEFT FOREARM
LOCATION SIMPLE: UPPER BACK
LOCATION SIMPLE: RIGHT CHEEK
LOCATION SIMPLE: ABDOMEN
LOCATION SIMPLE: CHEST
LOCATION SIMPLE: INFERIOR FOREHEAD
LOCATION SIMPLE: RIGHT FOREARM

## 2020-09-22 ENCOUNTER — HOSPITAL ENCOUNTER (OUTPATIENT)
Dept: LAB | Facility: MEDICAL CENTER | Age: 60
End: 2020-09-22
Payer: COMMERCIAL

## 2020-09-22 LAB
COVID ORDER STATUS COVID19: NORMAL
SARS-COV-2 RNA RESP QL NAA+PROBE: DETECTED
SPECIMEN SOURCE: ABNORMAL

## 2020-09-22 PROCEDURE — U0003 INFECTIOUS AGENT DETECTION BY NUCLEIC ACID (DNA OR RNA); SEVERE ACUTE RESPIRATORY SYNDROME CORONAVIRUS 2 (SARS-COV-2) (CORONAVIRUS DISEASE [COVID-19]), AMPLIFIED PROBE TECHNIQUE, MAKING USE OF HIGH THROUGHPUT TECHNOLOGIES AS DESCRIBED BY CMS-2020-01-R: HCPCS

## 2020-11-15 ENCOUNTER — HOSPITAL ENCOUNTER (OUTPATIENT)
Facility: MEDICAL CENTER | Age: 60
End: 2020-11-15
Attending: PHYSICIAN ASSISTANT
Payer: COMMERCIAL

## 2020-11-15 PROCEDURE — U0003 INFECTIOUS AGENT DETECTION BY NUCLEIC ACID (DNA OR RNA); SEVERE ACUTE RESPIRATORY SYNDROME CORONAVIRUS 2 (SARS-COV-2) (CORONAVIRUS DISEASE [COVID-19]), AMPLIFIED PROBE TECHNIQUE, MAKING USE OF HIGH THROUGHPUT TECHNOLOGIES AS DESCRIBED BY CMS-2020-01-R: HCPCS

## 2020-11-16 LAB
COVID ORDER STATUS COVID19: NORMAL
SARS-COV-2 RNA RESP QL NAA+PROBE: NOTDETECTED
SPECIMEN SOURCE: NORMAL

## 2021-01-26 ENCOUNTER — TELEMEDICINE (OUTPATIENT)
Dept: MEDICAL GROUP | Facility: PHYSICIAN GROUP | Age: 61
End: 2021-01-26
Payer: COMMERCIAL

## 2021-01-26 VITALS
WEIGHT: 165 LBS | TEMPERATURE: 98.7 F | HEART RATE: 92 BPM | BODY MASS INDEX: 29.23 KG/M2 | HEIGHT: 63 IN | OXYGEN SATURATION: 85 %

## 2021-01-26 DIAGNOSIS — R06.02 SHORTNESS OF BREATH: ICD-10-CM

## 2021-01-26 PROCEDURE — 99214 OFFICE O/P EST MOD 30 MIN: CPT | Mod: 95,CR | Performed by: NURSE PRACTITIONER

## 2021-01-26 ASSESSMENT — FIBROSIS 4 INDEX: FIB4 SCORE: 0.98

## 2021-01-26 ASSESSMENT — PATIENT HEALTH QUESTIONNAIRE - PHQ9: CLINICAL INTERPRETATION OF PHQ2 SCORE: 0

## 2021-01-27 ENCOUNTER — TELEPHONE (OUTPATIENT)
Dept: URGENT CARE | Facility: PHYSICIAN GROUP | Age: 61
End: 2021-01-27

## 2021-01-27 ENCOUNTER — HOSPITAL ENCOUNTER (OUTPATIENT)
Facility: MEDICAL CENTER | Age: 61
End: 2021-01-27
Attending: NURSE PRACTITIONER
Payer: COMMERCIAL

## 2021-01-27 ENCOUNTER — HOSPITAL ENCOUNTER (OUTPATIENT)
Dept: RADIOLOGY | Facility: MEDICAL CENTER | Age: 61
End: 2021-01-27
Attending: NURSE PRACTITIONER
Payer: COMMERCIAL

## 2021-01-27 ENCOUNTER — OFFICE VISIT (OUTPATIENT)
Dept: URGENT CARE | Facility: PHYSICIAN GROUP | Age: 61
End: 2021-01-27
Payer: COMMERCIAL

## 2021-01-27 VITALS
HEIGHT: 63 IN | RESPIRATION RATE: 16 BRPM | OXYGEN SATURATION: 96 % | WEIGHT: 170 LBS | TEMPERATURE: 98.6 F | HEART RATE: 88 BPM | BODY MASS INDEX: 30.12 KG/M2 | SYSTOLIC BLOOD PRESSURE: 120 MMHG | DIASTOLIC BLOOD PRESSURE: 90 MMHG

## 2021-01-27 DIAGNOSIS — Z86.16 HISTORY OF COVID-19: ICD-10-CM

## 2021-01-27 DIAGNOSIS — R06.02 SOB (SHORTNESS OF BREATH): ICD-10-CM

## 2021-01-27 DIAGNOSIS — Z86.16 PERSONAL HISTORY OF COVID-19: ICD-10-CM

## 2021-01-27 LAB
ALBUMIN SERPL BCP-MCNC: 4.4 G/DL (ref 3.2–4.9)
ALBUMIN/GLOB SERPL: 1.6 G/DL
ALP SERPL-CCNC: 100 U/L (ref 30–99)
ALT SERPL-CCNC: 16 U/L (ref 2–50)
ANION GAP SERPL CALC-SCNC: 9 MMOL/L (ref 7–16)
AST SERPL-CCNC: 17 U/L (ref 12–45)
BASOPHILS # BLD AUTO: 0.4 % (ref 0–1.8)
BASOPHILS # BLD: 0.03 K/UL (ref 0–0.12)
BILIRUB SERPL-MCNC: 0.2 MG/DL (ref 0.1–1.5)
BUN SERPL-MCNC: 16 MG/DL (ref 8–22)
CALCIUM SERPL-MCNC: 9.5 MG/DL (ref 8.5–10.5)
CHLORIDE SERPL-SCNC: 104 MMOL/L (ref 96–112)
CO2 SERPL-SCNC: 26 MMOL/L (ref 20–33)
CREAT SERPL-MCNC: 0.91 MG/DL (ref 0.5–1.4)
D DIMER PPP IA.FEU-MCNC: 0.34 UG/ML (FEU) (ref 0–0.5)
EOSINOPHIL # BLD AUTO: 0.18 K/UL (ref 0–0.51)
EOSINOPHIL NFR BLD: 2.3 % (ref 0–6.9)
ERYTHROCYTE [DISTWIDTH] IN BLOOD BY AUTOMATED COUNT: 42 FL (ref 35.9–50)
GLOBULIN SER CALC-MCNC: 2.8 G/DL (ref 1.9–3.5)
GLUCOSE SERPL-MCNC: 104 MG/DL (ref 65–99)
HCT VFR BLD AUTO: 42.3 % (ref 37–47)
HGB BLD-MCNC: 13.3 G/DL (ref 12–16)
IMM GRANULOCYTES # BLD AUTO: 0.02 K/UL (ref 0–0.11)
IMM GRANULOCYTES NFR BLD AUTO: 0.3 % (ref 0–0.9)
LYMPHOCYTES # BLD AUTO: 2.33 K/UL (ref 1–4.8)
LYMPHOCYTES NFR BLD: 29.3 % (ref 22–41)
MCH RBC QN AUTO: 27.8 PG (ref 27–33)
MCHC RBC AUTO-ENTMCNC: 31.4 G/DL (ref 33.6–35)
MCV RBC AUTO: 88.3 FL (ref 81.4–97.8)
MONOCYTES # BLD AUTO: 0.52 K/UL (ref 0–0.85)
MONOCYTES NFR BLD AUTO: 6.5 % (ref 0–13.4)
NEUTROPHILS # BLD AUTO: 4.86 K/UL (ref 2–7.15)
NEUTROPHILS NFR BLD: 61.2 % (ref 44–72)
NRBC # BLD AUTO: 0 K/UL
NRBC BLD-RTO: 0 /100 WBC
PLATELET # BLD AUTO: 287 K/UL (ref 164–446)
PMV BLD AUTO: 10.7 FL (ref 9–12.9)
POTASSIUM SERPL-SCNC: 4 MMOL/L (ref 3.6–5.5)
PROT SERPL-MCNC: 7.2 G/DL (ref 6–8.2)
RBC # BLD AUTO: 4.79 M/UL (ref 4.2–5.4)
SODIUM SERPL-SCNC: 139 MMOL/L (ref 135–145)
WBC # BLD AUTO: 7.9 K/UL (ref 4.8–10.8)

## 2021-01-27 PROCEDURE — 85025 COMPLETE CBC W/AUTO DIFF WBC: CPT

## 2021-01-27 PROCEDURE — 93000 ELECTROCARDIOGRAM COMPLETE: CPT | Performed by: NURSE PRACTITIONER

## 2021-01-27 PROCEDURE — 85379 FIBRIN DEGRADATION QUANT: CPT

## 2021-01-27 PROCEDURE — 99214 OFFICE O/P EST MOD 30 MIN: CPT | Performed by: NURSE PRACTITIONER

## 2021-01-27 PROCEDURE — 71046 X-RAY EXAM CHEST 2 VIEWS: CPT

## 2021-01-27 PROCEDURE — 80053 COMPREHEN METABOLIC PANEL: CPT

## 2021-01-27 SDOH — HEALTH STABILITY: MENTAL HEALTH: HOW OFTEN DO YOU HAVE 6 OR MORE DRINKS ON ONE OCCASION?: NEVER

## 2021-01-27 ASSESSMENT — FIBROSIS 4 INDEX: FIB4 SCORE: 0.98

## 2021-01-27 ASSESSMENT — ENCOUNTER SYMPTOMS
FEVER: 0
CARDIOVASCULAR NEGATIVE: 1
COUGH: 0
CHILLS: 0
SHORTNESS OF BREATH: 1
CONSTITUTIONAL NEGATIVE: 1

## 2021-01-27 ASSESSMENT — VISUAL ACUITY: OU: 1

## 2021-01-27 NOTE — PROGRESS NOTES
Virtual Visit: Established Patient   This visit was conducted via Zoom using secure and encrypted videoconferencing technology. The patient was in a private location in the state of [place].    The patient's identity was confirmed and verbal consent was obtained for this virtual visit.    Subjective:   CC:   Chief Complaint   Patient presents with   • Shortness of Breath     COVID in September Deborah May Molly is a 60 y.o. female presenting for evaluation and management of:    Shortness of breath  Patient reports covid this past fall and has shortness of breath since with exertion.  Reports that around siddharth she felt some tightness in chest, but this has dissipated, but the shortness of breath continues.  o2 saturation was 85 and now 91%. Occasional wheezing with deep breath.  No cough reported, no fever chills.       ROS as stated in hpi.  No acute distress, but some shortness of breath with talking noted.   Denies any recent fevers or chills. No nausea or vomiting. No chest pains or shortness of breath.     No Known Allergies    Current medicines (including changes today)  Current Outpatient Medications   Medication Sig Dispense Refill   • atorvastatin (LIPITOR) 40 MG Tab Take 1 Tab by mouth every day. 90 Tab 3   • Cholecalciferol (VITAMIN D3 PO) Take  by mouth.     • triamcinolone acetonide (KENALOG) 0.1 % Ointment Apply 1 Application to affected area(s) 2 times a day. 3 Tube 3   • fluticasone (FLONASE) 50 MCG/ACT nasal spray Spray 1 Spray in nose every day. 16 g 0   • ibuprofen (MOTRIN) 800 MG Tab Take 1 Tab by mouth every 8 hours as needed for Inflammation. 60 Tab 1   • docosahexanoic acid (OMEGA 3 FA) 1000 MG CAPS Take 1,000 mg by mouth 3 times a day, with meals.     • Calcium Carbonate-Vitamin D (CALCIUM + D PO) Take  by mouth every day.       No current facility-administered medications for this visit.        Patient Active Problem List    Diagnosis Date Noted   • Shortness of breath 01/26/2021  "  • Sun-damaged skin 2018   • Female bladder prolapse 2016   • DDD (degenerative disc disease), lumbar 2015   • DDD (degenerative disc disease), cervical 2015   • Hyperlipidemia with target LDL less than 130 2012   • Colon polyps 2012   • HTN (hypertension) 2011   • Goiter    • Migraine with aura and without status migrainosus, not intractable        Family History   Problem Relation Age of Onset   • Arthritis Mother         Osteoporosis   • Lung Disease Mother         COPD, smoker   • Heart Disease Mother         Arrythmia   • Hypertension Mother    • Hyperlipidemia Mother    • Heart Disease Maternal Grandfather         MI at 54   • Stroke Brother    • Hyperlipidemia Brother    • Arthritis Brother         fibromyalgia   • Heart Disease Father    • Lung Disease Father         COPD lung cancer    • Diabetes Maternal Aunt    • Stroke Maternal Aunt    • Diabetes Paternal Grandmother    • Stroke Maternal Uncle    • Hypertension Unknown        She  has a past medical history of Bilateral foot pain (2015), Colon polyps (), Flying phobia (2011), Goiter, Hand eczema (2014), HTN (hypertension) (2011), Hyperlipidemia LDL goal < 130 (2012), Hypertension, Hypertriglyceridemia, and Migraine.  She  has a past surgical history that includes tonsillectomy and adenoidectomy; colonoscopy (); pr  delivery only (); mass excision ortho (2013); and tendon repair (2013).       Objective:   Pulse 92   Temp 37.1 °C (98.7 °F)   Ht 1.6 m (5' 3\")   Wt 74.8 kg (165 lb)   SpO2 (!) 85%   BMI 29.23 kg/m²     Physical Exam:  Constitutional: Alert, no distress, well-groomed.  Skin: No rashes in visible areas.  Eye: Round. Conjunctiva clear, lids normal. No icterus.   ENMT: Lips pink without lesions, good dentition, moist mucous membranes. Phonation normal.  Neck: No masses, no thyromegaly. Moves freely without pain.  Respiratory: slight shortness of " breath with talking, no cough or audible wheeze  Psych: Alert and oriented x3, normal affect and mood.       Assessment and Plan:   The following treatment plan was discussed:     1. Shortness of breath  New problem to me.  Acute.  Post covid in September.  Concerned that shortness of breath is not improving.  Need to rule out infection, pulmonary emboli or other lung disease.  Advised to proceed to an urgent care where she can be evaluated in person.  Monitor.       Follow-up: Return if symptoms worsen or fail to improve.

## 2021-01-27 NOTE — ASSESSMENT & PLAN NOTE
Patient reports covid this past fall and has shortness of breath since with exertion.  Reports that around siddharth she felt some tightness in chest, but this has dissipated, but the shortness of breath continues.  o2 saturation was 85 and now 91%. Occasional wheezing with deep breath.  No cough reported, no fever chills.

## 2021-01-28 NOTE — RESULT ENCOUNTER NOTE
Phoned patient to discuss results. Unremarkable. Referral placed for pulmonology evaluation and treatment of SOB. All questions answered.

## 2021-03-15 DIAGNOSIS — Z23 NEED FOR VACCINATION: ICD-10-CM

## 2021-03-23 ENCOUNTER — OFFICE VISIT (OUTPATIENT)
Dept: SLEEP MEDICINE | Facility: MEDICAL CENTER | Age: 61
End: 2021-03-23
Payer: COMMERCIAL

## 2021-03-23 VITALS
HEIGHT: 63 IN | SYSTOLIC BLOOD PRESSURE: 146 MMHG | OXYGEN SATURATION: 95 % | WEIGHT: 180.31 LBS | BODY MASS INDEX: 31.95 KG/M2 | DIASTOLIC BLOOD PRESSURE: 90 MMHG | HEART RATE: 86 BPM

## 2021-03-23 DIAGNOSIS — Z86.16 HISTORY OF 2019 NOVEL CORONAVIRUS DISEASE (COVID-19): ICD-10-CM

## 2021-03-23 DIAGNOSIS — R06.09 DYSPNEA ON EXERTION: ICD-10-CM

## 2021-03-23 PROCEDURE — 94761 N-INVAS EAR/PLS OXIMETRY MLT: CPT | Performed by: INTERNAL MEDICINE

## 2021-03-23 PROCEDURE — 99204 OFFICE O/P NEW MOD 45 MIN: CPT | Mod: 25 | Performed by: INTERNAL MEDICINE

## 2021-03-23 ASSESSMENT — ENCOUNTER SYMPTOMS
ABDOMINAL PAIN: 0
SORE THROAT: 0
EYE DISCHARGE: 0
VOMITING: 0
DIZZINESS: 0
NAUSEA: 0
MYALGIAS: 0
SHORTNESS OF BREATH: 1
FEVER: 0
ORTHOPNEA: 0
PSYCHIATRIC NEGATIVE: 1
SPUTUM PRODUCTION: 0
FOCAL WEAKNESS: 0
WEIGHT LOSS: 0
SENSORY CHANGE: 0
CHILLS: 0
EYE PAIN: 0
LOSS OF CONSCIOUSNESS: 0
WHEEZING: 0
HEADACHES: 0
SINUS PAIN: 0
DIARRHEA: 0
COUGH: 0
STRIDOR: 0

## 2021-03-23 ASSESSMENT — FIBROSIS 4 INDEX: FIB4 SCORE: 0.9

## 2021-03-23 NOTE — PROCEDURES
Multi-Ox Readings  Multi Ox #1 Room air   O2 sat % at rest 97(pulse: 84)   O2 sat % on exertion 92(pulse: 92)   O2 sat average on exertion     Multi Ox #2     O2 sat % at rest     O2 sat % on exertion     O2 sat average on exertion       Oxygen Use     Oxygen Frequency     Duration of need     Is the patient mobile within the home?     CPAP Use?     BIPAP Use?     Servo Titration

## 2021-03-23 NOTE — ASSESSMENT & PLAN NOTE
Ongoing post acute COVID/lumbar syndrome  SARS-CoV-2 swab + 9/22/2020  Subsequently swab - 1/2020  Completed vaccination series in 3/2021; will hopefully be part of 1/3 of patients who feel symptomatic improvement following completion of vaccine series

## 2021-03-23 NOTE — ASSESSMENT & PLAN NOTE
No desaturations on multi ox today in clinic  Check overnight oximetry, PFTs  Encouraged to continue graduated exercise regimen without overexertion, checking pulse oximetry during exercise

## 2021-03-23 NOTE — PROGRESS NOTES
"Pulmonary Clinic- Initial Consult    Date of Service: 3/23/2021    Referring Physician: Jameel Lovelace,*    Reason for Consult: History of COVID-19 illness    Chief Complaint:   Chief Complaint   Patient presents with   • Establish Care     Referred by Jameel CASTILLO for SOB/Personal Hx of COVID-19   • Other     Labs 01/27/21, CXR 01/27/21     HPI:   Laya Barnes is a very pleasant 61 y.o. female never smoker who is followed by Jameel Lovelace, and is referred to the pulmonary clinic for persistent shortness of breath following COVID-19 illness. Patient has not been seen by pulmonary in the past, no prior PFTs and no prior/pre-existing pulmonary issues.  Past medical history of hypertension, hyperlipidemia on atorvastatin.    SARS-CoV-2 swab + 9/22/2020.  Subsequently swab negative 11/2020.    Persistent dyspnea on exertion shortness of breath since illness although feels she is overall improving.  Notices it mostly keeping up with her puppy.  She continues to exercise with treadmill and Pilates, when exerting herself she states she feels she \"cannot take a deep breath\"    Presented to urgent care in 1/2021 for above symptoms, 2 view CXR unremarkable.  ECG NSR 77, no ischemic changes. D-dimer, CBC, CMP also unremarkable.    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 at [facility]"   • TENDON REPAIR  2013    Performed by Gigi Chung M.D. at SURGERY [facility]   • COLONOSCOPY     • PB  DELIVERY ONLY     • PRIMARY C SECTION     • TONSILLECTOMY     • TONSILLECTOMY AND ADENOIDECTOMY         Social History     Socioeconomic History   • Marital status:      Spouse name: Not on file   • Number of children: Not on file   • Years of education: Not on file   • Highest education level: Not on file   Occupational History   • Occupation: Teacher     Employer: [employer]   Tobacco Use   • Smoking status: Never Smoker   • Smokeless tobacco: Never Used   Substance and Sexual Activity   • Alcohol use: Yes     Comment: once per year   • Drug use: No   • Sexual activity: Yes     Partners: Male     Birth control/protection: Post-Menopausal   Other Topics Concern   • Not on file   Social History Narrative    Patient is  and works full-time as an  at [employer]     Social Determinants of Health     Financial Resource Strain:    • Difficulty of Paying Living Expenses:    Food Insecurity:    • Worried About Running Out of Food in the Last Year:    • Ran Out of Food in the Last Year:    Transportation Needs:    • Lack of Transportation (Medical):    • Lack of Transportation (Non-Medical):    Physical Activity:    • Days of Exercise per Week:    • Minutes of Exercise per Session:    Stress:    • Feeling of Stress :    Social Connections:    • Frequency of Communication with Friends and Family:    • Frequency of Social Gatherings with Friends and Family:    • Attends Yazidism Services:    • Active Member of Clubs or Organizations:    • Attends Club or Organization Meetings:    • Marital Status:    Intimate Partner Violence:    • Fear of Current or Ex-Partner:    • Emotionally Abused:    • Physically Abused:    • Sexually Abused:           Family History   Problem Relation Age of Onset   • Arthritis Mother         Osteoporosis   •  Lung Disease Mother         COPD, smoker   • Heart Disease Mother         Arrythmia   • Hypertension Mother    • Hyperlipidemia Mother    • Heart Disease Maternal Grandfather         MI at 54   • Stroke Brother    • Hyperlipidemia Brother    • Arthritis Brother         fibromyalgia   • Heart Disease Father    • Lung Disease Father         COPD lung cancer    • Diabetes Maternal Aunt    • Stroke Maternal Aunt    • Diabetes Paternal Grandmother    • Stroke Maternal Uncle    • Hypertension Other        Current Outpatient Medications on File Prior to Visit   Medication Sig Dispense Refill   • atorvastatin (LIPITOR) 40 MG Tab Take 1 Tab by mouth every day. 90 Tab 3   • Cholecalciferol (VITAMIN D3 PO) Take 5,000 Units by mouth every day.     • triamcinolone acetonide (KENALOG) 0.1 % Ointment Apply 1 Application to affected area(s) 2 times a day. 3 Tube 3   • fluticasone (FLONASE) 50 MCG/ACT nasal spray Spray 1 Spray in nose every day. 16 g 0   • ibuprofen (MOTRIN) 800 MG Tab Take 1 Tab by mouth every 8 hours as needed for Inflammation. 60 Tab 1   • Calcium Carbonate-Vitamin D (CALCIUM + D PO) Take  by mouth every day.     • tetanus-diphth-acell pertussis (BOOSTRIX, AGES 7 & OLDER,) 5-2.5-18.5 LF-MCG/0.5 Suspension Boostrix Tdap 2.5 Lf unit-8 mcg-5 Lf/0.5 mL intramuscular syringe   ADM 0.5ML IM UTD     • docosahexanoic acid (OMEGA 3 FA) 1000 MG CAPS Take 1,000 mg by mouth 3 times a day, with meals.       No current facility-administered medications on file prior to visit.     Allergies: Patient has no known allergies.    ROS:   Review of Systems   Constitutional: Negative for chills, fever and weight loss.   HENT: Negative for congestion, sinus pain and sore throat.    Eyes: Negative for pain and discharge.   Respiratory: Positive for shortness of breath. Negative for cough, sputum production, wheezing and stridor.    Cardiovascular: Negative for chest pain, orthopnea and leg swelling.   Gastrointestinal: Negative for  "abdominal pain, diarrhea, nausea and vomiting.   Genitourinary: Negative for dysuria, frequency and urgency.   Musculoskeletal: Negative for myalgias.   Skin: Negative for rash.   Neurological: Negative for dizziness, sensory change, focal weakness, loss of consciousness and headaches.   Psychiatric/Behavioral: Negative.    All other systems reviewed and are negative.    Vitals:  /90 (BP Location: Left arm, Patient Position: Sitting, BP Cuff Size: Adult)   Pulse 86   Ht 1.6 m (5' 3\")   Wt 81.8 kg (180 lb 5 oz)   SpO2 95%     Physical Exam:  Physical Exam  Vitals and nursing note reviewed.   Constitutional:       General: She is not in acute distress.     Appearance: Normal appearance. She is well-developed. She is not diaphoretic.   Eyes:      General: No scleral icterus.        Right eye: No discharge.         Left eye: No discharge.      Conjunctiva/sclera: Conjunctivae normal.      Pupils: Pupils are equal, round, and reactive to light.   Neck:      Thyroid: No thyromegaly.      Vascular: No JVD.   Cardiovascular:      Rate and Rhythm: Normal rate and regular rhythm.      Heart sounds: Normal heart sounds. No murmur. No gallop.    Pulmonary:      Effort: Pulmonary effort is normal. No respiratory distress.      Breath sounds: Normal breath sounds. No wheezing or rales.   Abdominal:      General: There is no distension.      Palpations: Abdomen is soft.      Tenderness: There is no abdominal tenderness. There is no guarding.   Musculoskeletal:         General: No tenderness.   Lymphadenopathy:      Cervical: No cervical adenopathy.   Skin:     General: Skin is warm.      Capillary Refill: Capillary refill takes less than 2 seconds.      Findings: No erythema or rash.   Neurological:      Mental Status: She is alert and oriented to person, place, and time.      Cranial Nerves: No cranial nerve deficit.      Sensory: No sensory deficit.      Motor: No abnormal muscle tone.   Psychiatric:         Behavior: " Behavior normal.         Laboratory Data:    PFTs as reviewed by me personally show:  No prior PFTs at time of consultation    Imaging as reviewed by me personally show:    1/2021 CXR: unremarkable    Assessment/Plan:    Problem List Items Addressed This Visit     Dyspnea on exertion     No desaturations on multi ox today in clinic  Check overnight oximetry, PFTs  Encouraged to continue graduated exercise regimen without overexertion, checking pulse oximetry during exercise         History of 2019 novel coronavirus disease (COVID-19)     Ongoing post acute COVID/lumbar syndrome  SARS-CoV-2 swab + 9/22/2020  Subsequently swab - 1/2020  Completed vaccination series in 3/2021; will hopefully be part of 1/3 of patients who feel symptomatic improvement following completion of vaccine series         Relevant Orders    Multiple Oximetry    Overnight Oximetry    PULMONARY FUNCTION TESTS -Test requested: Complete Pulmonary Function Test         Return in about 6 weeks (around 5/4/2021).     This note was generated using voice recognition software which has a chance of producing errors of grammar and possibly content.  I have made every reasonable attempt to find and correct any obvious errors, but it should be expected that some may not be found prior to finalization of this note.  __________  Jermaine Chapin MD  Pulmonary and Critical Care Medicine  [facility]

## 2021-04-06 ENCOUNTER — HOME STUDY (OUTPATIENT)
Dept: SLEEP MEDICINE | Facility: MEDICAL CENTER | Age: 61
End: 2021-04-06
Attending: INTERNAL MEDICINE
Payer: COMMERCIAL

## 2021-04-06 DIAGNOSIS — Z86.16 HISTORY OF 2019 NOVEL CORONAVIRUS DISEASE (COVID-19): ICD-10-CM

## 2021-04-12 PROCEDURE — 94762 N-INVAS EAR/PLS OXIMTRY CONT: CPT | Performed by: INTERNAL MEDICINE

## 2021-04-12 NOTE — PROCEDURES
Overnight oximetry performed on room air.      The basal SPO2 was 91%.  No significant desaturations noted with only 3 minutes spent below SPO2 of 88% (some artifact).    Impression:  Overall normal oximetry on room air.

## 2021-04-21 ENCOUNTER — NON-PROVIDER VISIT (OUTPATIENT)
Dept: SLEEP MEDICINE | Facility: MEDICAL CENTER | Age: 61
End: 2021-04-21
Attending: INTERNAL MEDICINE
Payer: COMMERCIAL

## 2021-04-21 VITALS — BODY MASS INDEX: 30.83 KG/M2 | WEIGHT: 174 LBS | HEIGHT: 63 IN

## 2021-04-21 PROCEDURE — 94726 PLETHYSMOGRAPHY LUNG VOLUMES: CPT | Performed by: INTERNAL MEDICINE

## 2021-04-21 PROCEDURE — 94729 DIFFUSING CAPACITY: CPT | Performed by: INTERNAL MEDICINE

## 2021-04-21 PROCEDURE — 94060 EVALUATION OF WHEEZING: CPT | Performed by: INTERNAL MEDICINE

## 2021-04-21 ASSESSMENT — PULMONARY FUNCTION TESTS
FEV1_PERCENT_CHANGE: 0
FVC_PREDICTED: 2.97
FEV1: 2.44
FVC_PERCENT_PREDICTED: 98
FEV1/FVC: 83
FEV1/FVC_PERCENT_PREDICTED: 104
FVC_LLN: 2.48
FEV1/FVC_PERCENT_CHANGE: 5
FEV1/FVC: 79
FEV1_PREDICTED: 2.35
FEV1/FVC_PREDICTED: 79
FEV1/FVC_PERCENT_PREDICTED: 99
FEV1_PERCENT_PREDICTED: 103
FVC: 2.93
FEV1_PERCENT_CHANGE: 5
FVC_PERCENT_PREDICTED: 98
FEV1_LLN: 1.96
FEV1/FVC_PERCENT_PREDICTED: 105
FEV1/FVC_PERCENT_LLN: 66
FEV1: 2.31
FEV1/FVC: 83.28
FEV1/FVC: 79
FEV1/FVC_PERCENT_PREDICTED: 79
FVC: 2.92
FEV1/FVC_PERCENT_PREDICTED: 100
FEV1_PERCENT_PREDICTED: 98

## 2021-04-21 ASSESSMENT — FIBROSIS 4 INDEX: FIB4 SCORE: 0.9
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
## 2021-04-22 ENCOUNTER — OFFICE VISIT (OUTPATIENT)
Dept: SLEEP MEDICINE | Facility: MEDICAL CENTER | Age: 61
End: 2021-04-22
Payer: COMMERCIAL

## 2021-04-22 VITALS
HEART RATE: 98 BPM | WEIGHT: 175.06 LBS | SYSTOLIC BLOOD PRESSURE: 132 MMHG | OXYGEN SATURATION: 95 % | HEIGHT: 63 IN | DIASTOLIC BLOOD PRESSURE: 90 MMHG | BODY MASS INDEX: 31.02 KG/M2

## 2021-04-22 DIAGNOSIS — R06.09 DYSPNEA ON EXERTION: ICD-10-CM

## 2021-04-22 DIAGNOSIS — Z86.16 HISTORY OF 2019 NOVEL CORONAVIRUS DISEASE (COVID-19): ICD-10-CM

## 2021-04-22 PROCEDURE — 99214 OFFICE O/P EST MOD 30 MIN: CPT | Performed by: INTERNAL MEDICINE

## 2021-04-22 ASSESSMENT — ENCOUNTER SYMPTOMS
FOCAL WEAKNESS: 0
SHORTNESS OF BREATH: 1
SPUTUM PRODUCTION: 0
DIARRHEA: 0
SENSORY CHANGE: 0
VOMITING: 0
HEADACHES: 0
SORE THROAT: 0
LOSS OF CONSCIOUSNESS: 0
COUGH: 0
DIZZINESS: 0
WHEEZING: 0
ABDOMINAL PAIN: 0
FEVER: 0
ORTHOPNEA: 0
MYALGIAS: 0
EYE PAIN: 0
STRIDOR: 0
WEIGHT LOSS: 0
CHILLS: 0
NAUSEA: 0
PSYCHIATRIC NEGATIVE: 1
EYE DISCHARGE: 0
SINUS PAIN: 0

## 2021-04-22 ASSESSMENT — FIBROSIS 4 INDEX: FIB4 SCORE: 0.9

## 2021-04-22 NOTE — ASSESSMENT & PLAN NOTE
Ongoing post acute COVID/lumbar syndrome  SARS-CoV-2 swab + 9/22/2020  Subsequently swab - 1/2020  Completed vaccination series in 3/2021   Counseled to anticipate a prolonged recovery may take 6 to 12 months, encouraged gradual increase in aerobic exercise

## 2021-04-22 NOTE — ASSESSMENT & PLAN NOTE
No desaturations on multi ox today in clinic  Normal overnight oximetry, PFTs  Encouraged to continue graduated exercise regimen without overexertion, checking pulse oximetry during exercise

## 2021-04-22 NOTE — PROGRESS NOTES
"Pulmonary Clinic Note    Chief Complaint:  Chief Complaint   Patient presents with   • Follow-Up     DE LEÓN/Hx of COVID-19. Last seen 03/23/21   • Results     OPO 04/06/21, PFT 04/21/21     HPI:   Laya Barnes is a very pleasant 61 y.o. female never smoker who returns to the pulmonary clinic for persistent shortness of breath following COVID-19 illness. Patient has not been seen by pulmonary in the past, no prior PFTs and no prior/pre-existing pulmonary issues.  Past medical history of hypertension, hyperlipidemia on atorvastatin.     SARS-CoV-2 swab + 9/22/2020.  Subsequently swab negative 11/2020.     Persistent dyspnea on exertion shortness of breath since illness although feels she is overall improving.  Notices it mostly keeping up with her puppy.  She continues to exercise with treadmill and Pilates, when exerting herself she states she feels she \"cannot take a deep breath\"     Presented to urgent care in 1/2021 for above symptoms, 2 view CXR unremarkable.    ECG NSR 77, no ischemic changes. D-dimer, CBC, CMP also unremarkable.     MMRC Grade: 2: Walks slower than friends due to breathlessness, has to stop at own pace    Interval events since last visit  Normal overnight oximetry air  Normal PFTs, no significant BD response  Symptomatically stable, still having a hard time keeping up with her puppy.    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 at [facility] ORS   • TENDON REPAIR  8/1/2013    " Performed by Gigi Chung M.D. at SURGERY [facility]   • COLONOSCOPY     • PB  DELIVERY ONLY     • PRIMARY C SECTION     • TONSILLECTOMY     • TONSILLECTOMY AND ADENOIDECTOMY         Social History     Socioeconomic History   • Marital status:      Spouse name: Not on file   • Number of children: Not on file   • Years of education: Not on file   • Highest education level: Not on file   Occupational History   • Occupation: Teacher     Employer: [employer]   Tobacco Use   • Smoking status: Never Smoker   • Smokeless tobacco: Never Used   Substance and Sexual Activity   • Alcohol use: Yes     Comment: once per year   • Drug use: No   • Sexual activity: Yes     Partners: Male     Birth control/protection: Post-Menopausal   Other Topics Concern   • Not on file   Social History Narrative    Patient is  and works full-time as an  at [employer]     Social Determinants of Health     Financial Resource Strain:    • Difficulty of Paying Living Expenses:    Food Insecurity:    • Worried About Running Out of Food in the Last Year:    • Ran Out of Food in the Last Year:    Transportation Needs:    • Lack of Transportation (Medical):    • Lack of Transportation (Non-Medical):    Physical Activity:    • Days of Exercise per Week:    • Minutes of Exercise per Session:    Stress:    • Feeling of Stress :    Social Connections:    • Frequency of Communication with Friends and Family:    • Frequency of Social Gatherings with Friends and Family:    • Attends Congregational Services:    • Active Member of Clubs or Organizations:    • Attends Club or Organization Meetings:    • Marital Status:    Intimate Partner Violence:    • Fear of Current or Ex-Partner:    • Emotionally Abused:    • Physically Abused:    • Sexually Abused:           Family History   Problem Relation Age of Onset   • Arthritis Mother         Osteoporosis   • Lung Disease Mother         COPD,  smoker   • Heart Disease Mother         Arrythmia   • Hypertension Mother    • Hyperlipidemia Mother    • Heart Disease Maternal Grandfather         MI at 54   • Stroke Brother    • Hyperlipidemia Brother    • Arthritis Brother         fibromyalgia   • Heart Disease Father    • Lung Disease Father         COPD lung cancer    • Diabetes Maternal Aunt    • Stroke Maternal Aunt    • Diabetes Paternal Grandmother    • Stroke Maternal Uncle    • Hypertension Other        Current Outpatient Medications on File Prior to Visit   Medication Sig Dispense Refill   • atorvastatin (LIPITOR) 40 MG Tab Take 1 Tab by mouth every day. 90 Tab 3   • Cholecalciferol (VITAMIN D3 PO) Take 5,000 Units by mouth every day.     • triamcinolone acetonide (KENALOG) 0.1 % Ointment Apply 1 Application to affected area(s) 2 times a day. 3 Tube 3   • ibuprofen (MOTRIN) 800 MG Tab Take 1 Tab by mouth every 8 hours as needed for Inflammation. 60 Tab 1   • Calcium Carbonate-Vitamin D (CALCIUM + D PO) Take  by mouth every day.     • fluticasone (FLONASE) 50 MCG/ACT nasal spray Spray 1 Spray in nose every day. (Patient not taking: Reported on 4/22/2021) 16 g 0     No current facility-administered medications on file prior to visit.       Allergies: Patient has no known allergies.      ROS:   Review of Systems   Constitutional: Negative for chills, fever and weight loss.   HENT: Negative for congestion, sinus pain and sore throat.    Eyes: Negative for pain and discharge.   Respiratory: Positive for shortness of breath. Negative for cough, sputum production, wheezing and stridor.    Cardiovascular: Negative for chest pain, orthopnea and leg swelling.   Gastrointestinal: Negative for abdominal pain, diarrhea, nausea and vomiting.   Genitourinary: Negative for dysuria, frequency and urgency.   Musculoskeletal: Negative for myalgias.   Skin: Negative for rash.   Neurological: Negative for dizziness, sensory change, focal weakness, loss of consciousness and  "headaches.   Psychiatric/Behavioral: Negative.    All other systems reviewed and are negative.      Vitals:  /90 (BP Location: Right arm, Patient Position: Sitting, BP Cuff Size: Adult)   Pulse 98   Ht 1.588 m (5' 2.5\")   Wt 79.4 kg (175 lb 1 oz)   SpO2 95%     Physical Exam:  Physical Exam  Vitals and nursing note reviewed.   Constitutional:       General: She is not in acute distress.     Appearance: Normal appearance. She is well-developed. She is not diaphoretic.   Eyes:      General: No scleral icterus.        Right eye: No discharge.         Left eye: No discharge.      Conjunctiva/sclera: Conjunctivae normal.      Pupils: Pupils are equal, round, and reactive to light.   Neck:      Thyroid: No thyromegaly.      Vascular: No JVD.   Cardiovascular:      Rate and Rhythm: Normal rate and regular rhythm.      Heart sounds: Normal heart sounds. No murmur. No gallop.    Pulmonary:      Effort: Pulmonary effort is normal. No respiratory distress.      Breath sounds: Normal breath sounds. No wheezing or rales.   Abdominal:      General: There is no distension.      Palpations: Abdomen is soft.      Tenderness: There is no abdominal tenderness. There is no guarding.   Musculoskeletal:         General: No tenderness.   Lymphadenopathy:      Cervical: No cervical adenopathy.   Skin:     General: Skin is warm.      Capillary Refill: Capillary refill takes less than 2 seconds.      Findings: No erythema or rash.   Neurological:      Mental Status: She is alert and oriented to person, place, and time.      Cranial Nerves: No cranial nerve deficit.      Sensory: No sensory deficit.      Motor: No abnormal muscle tone.   Psychiatric:         Behavior: Behavior normal.       Laboratory Data:    PFTs as reviewed by me personally show:  Normal PFTs, no significant BD response    Imaging as reviewed by me personally show:     2 view CXR unremarkable.     Assessment/Plan:    Problem List Items Addressed This Visit     " Dyspnea on exertion     No desaturations on multi ox today in clinic  Normal overnight oximetry, PFTs  Encouraged to continue graduated exercise regimen without overexertion, checking pulse oximetry during exercise         History of 2019 novel coronavirus disease (COVID-19)     Ongoing post acute COVID/lumbar syndrome  SARS-CoV-2 swab + 9/22/2020  Subsequently swab - 1/2020  Completed vaccination series in 3/2021   Counseled to anticipate a prolonged recovery may take 6 to 12 months, encouraged gradual increase in aerobic exercise             Return if symptoms worsen or fail to improve.     This note was generated using voice recognition software which has a chance of producing errors of grammar and possibly content.  I have made every reasonable attempt to find and correct any obvious errors, but it should be expected that some may not be found prior to finalization of this note.  __________  Jermaine Chapin MD  Pulmonary and Critical Care Medicine  [facility]

## 2021-05-04 ENCOUNTER — TELEPHONE (OUTPATIENT)
Dept: MEDICAL GROUP | Facility: PHYSICIAN GROUP | Age: 61
End: 2021-05-04

## 2021-05-04 NOTE — TELEPHONE ENCOUNTER
Future Appointments       Provider Department Center    5/11/2021 2:30 PM DINO [facility] [place]        ESTABLISHED PATIENT PRE-VISIT PLANNING     Patient was NOT contacted to complete PVP.     Note: Patient will not be contacted if there is no indication to call.     1.  Reviewed notes from the last few office visits within the medical group: Yes, LOV 01/26/2021    2.  If any orders were placed at last visit or intended to be done for this visit (i.e. 6 mos follow-up), do we have Results/Consult Notes?         •  Labs - Labs ordered, completed on 01/27/21 and results are in chart.  Note: If patient appointment is for lab review and patient did not complete labs, check with provider if OK to reschedule patient until labs completed.       •  Imaging - Imaging ordered, completed and results are in chart.       •  Referrals - Referral ordered, patient was seen and consult notes are in chart. Care Teams updated  YES.    3. Is this appointment scheduled as a Hospital Follow-Up? No    4.  Immunizations were updated in Epic using Reconcile Outside Information activity? Yes    5.  Patient is due for the following Health Maintenance Topics:   Health Maintenance Due   Topic Date Due   • HEPATITIS C SCREENING  Never done   • IMM ZOSTER VACCINES (2 of 3) 09/09/2015   • COLONOSCOPY  09/18/2020     6.  AHA (Pulse8) form printed for Provider?   Patient advised to arrive 15 minutes prior to scheduled appointment

## 2021-05-11 ENCOUNTER — OFFICE VISIT (OUTPATIENT)
Dept: MEDICAL GROUP | Facility: PHYSICIAN GROUP | Age: 61
End: 2021-05-11
Payer: COMMERCIAL

## 2021-05-11 VITALS
TEMPERATURE: 97.7 F | DIASTOLIC BLOOD PRESSURE: 90 MMHG | BODY MASS INDEX: 32.76 KG/M2 | HEIGHT: 62 IN | RESPIRATION RATE: 16 BRPM | SYSTOLIC BLOOD PRESSURE: 130 MMHG | OXYGEN SATURATION: 98 % | HEART RATE: 80 BPM | WEIGHT: 178 LBS

## 2021-05-11 DIAGNOSIS — Z86.16 HISTORY OF 2019 NOVEL CORONAVIRUS DISEASE (COVID-19): ICD-10-CM

## 2021-05-11 DIAGNOSIS — I10 ESSENTIAL HYPERTENSION: ICD-10-CM

## 2021-05-11 DIAGNOSIS — R06.09 DYSPNEA ON EXERTION: ICD-10-CM

## 2021-05-11 DIAGNOSIS — E78.5 HYPERLIPIDEMIA WITH TARGET LDL LESS THAN 130: ICD-10-CM

## 2021-05-11 DIAGNOSIS — K63.5 POLYP OF COLON, UNSPECIFIED PART OF COLON, UNSPECIFIED TYPE: ICD-10-CM

## 2021-05-11 PROCEDURE — 99396 PREV VISIT EST AGE 40-64: CPT | Performed by: NURSE PRACTITIONER

## 2021-05-11 RX ORDER — ESTRADIOL 0.1 MG/G
CREAM VAGINAL
COMMUNITY
Start: 2021-05-08

## 2021-05-11 RX ORDER — LOSARTAN POTASSIUM 25 MG/1
25 TABLET ORAL DAILY
Qty: 30 TABLET | Refills: 1 | Status: SHIPPED | OUTPATIENT
Start: 2021-05-11 | End: 2021-07-06

## 2021-05-11 ASSESSMENT — FIBROSIS 4 INDEX: FIB4 SCORE: 0.9

## 2021-05-11 NOTE — PROGRESS NOTES
"Chief Complaint   Patient presents with   • Annual Exam       Subjective:   Laya Barnes is a 61 y.o. female here today for preventative annual exam    HTN (hypertension)  Reporting ongoing borderline elevations  130/90 today. No chest pain reported.     Dyspnea on exertion  Seen by pulmonology.  Testing done.  Post covid most likely.     History of 2019 novel coronavirus disease (COVID-19)  9/2020. Recheck with pulmonology.          Current medicines (including changes today)  Current Outpatient Medications   Medication Sig Dispense Refill   • estradiol (ESTRACE) 0.1 MG/GM vaginal cream      • losartan (COZAAR) 25 MG Tab Take 1 tablet by mouth every day. 30 tablet 1   • atorvastatin (LIPITOR) 40 MG Tab Take 1 Tab by mouth every day. 90 Tab 3   • Cholecalciferol (VITAMIN D3 PO) Take 5,000 Units by mouth every day.     • triamcinolone acetonide (KENALOG) 0.1 % Ointment Apply 1 Application to affected area(s) 2 times a day. 3 Tube 3   • ibuprofen (MOTRIN) 800 MG Tab Take 1 Tab by mouth every 8 hours as needed for Inflammation. 60 Tab 1   • Calcium Carbonate-Vitamin D (CALCIUM + D PO) Take  by mouth every day.       No current facility-administered medications for this visit.     She  has a past medical history of Bilateral foot pain (11/9/2015), Bronchitis, Chickenpox, Colon polyps (6/11), Flying phobia (11/4/2011), Gasping for breath, Goiter, Hand eczema (8/4/2014), HTN (hypertension) (11/4/2011), Hyperlipidemia LDL goal < 130 (6/29/2012), Hypertension, Hypertriglyceridemia, Migraine, Pneumonia, Shortness of breath, and Wears glasses.    ROS as stated  No chest pain, no shortness of breath, no abdominal pain       Objective:     /90 (BP Location: Left arm, Patient Position: Sitting)   Pulse 80   Temp 36.5 °C (97.7 °F) (Temporal)   Resp 16   Ht 1.575 m (5' 2\")   Wt 80.7 kg (178 lb)   SpO2 98%  Body mass index is 32.56 kg/m². blood pressure elevated.   Physical Exam:  Constitutional: Alert, no " distress.  Skin: Warm, dry, good turgor,no cyanosis, no rashes in visible areas.  Eye: Equal, round and reactive, conjunctiva clear, lids normal.  Ears: No tenderness, no discharge.  External canals are without any significant edema or erythema.  Tympanic membranes are without any inflammation, no effusion.  Gross auditory acuity is intact.  Nose: symmetrical without tenderness, no discharge.  Mouth/Throat: lips without lesion.  Oropharynx clear.  Throat without erythema, exudates or tonsillar enlargement.  Neck: Trachea midline, no masses, no obvious thyroid enlargement.. No cervical or supraclavicular lymphadenopathy. Range of motion within normal limits.  Neuro: Cranial nerves 2-12 grossly intact.  No sensory deficit.  Respiratory: Unlabored respiratory effort, lungs clear to auscultation, no wheezes, no ronchi.  Cardiovascular: Normal S1, S2, no murmur, no edema.  Abdomen: Soft, non-tender, no masses, no guarding,  no hepatosplenomegaly.  Psych: Alert and oriented x3, normal affect and mood and judgement.        Assessment and Plan:   The following treatment plan was discussed    1. Essential hypertension  Acute new issue.  bp running 130/90 consistently.  Will start low dose losartan.  bp cuff ordered.  bp log. Return in 4-5 weeks for bp check.  Red flags reviewed.  Continue exercise, watching salt.  Monitor and follow.     2. Dyspnea on exertion  Chronic, seen by pulmonology.  Post covid syndrome.  Continue to gradually increase exercise.  See #3    3. History of 2019 novel coronavirus disease (COVID-19)  See #2    4. Polyp of colon, unspecified part of colon, unspecified type  Due for recall.  Referral placed.  Monitor and follow.   - REFERRAL TO GASTROENTEROLOGY    5. Hyperlipidemia with target LDL less than 130  Chronic, ongoing, started statin last year.  Recheck levels.  Continue with atorvastatin.  Monitor and follow.   - Lipid Profile; Future      Followup: No follow-ups on file.         Educated in  proper administration of medication(s) ordered today including safety, possible SE, risks, benefits, rationale and alternatives to therapy.     Please note that this dictation was created using voice recognition software. I have made every reasonable attempt to correct obvious errors, but I expect that there are errors of grammar and possibly content that I did not discover before finalizing the note.

## 2021-06-09 VITALS — DIASTOLIC BLOOD PRESSURE: 79 MMHG | SYSTOLIC BLOOD PRESSURE: 115 MMHG

## 2021-06-10 ENCOUNTER — HOSPITAL ENCOUNTER (OUTPATIENT)
Dept: LAB | Facility: MEDICAL CENTER | Age: 61
End: 2021-06-10
Attending: NURSE PRACTITIONER
Payer: COMMERCIAL

## 2021-06-10 VITALS — SYSTOLIC BLOOD PRESSURE: 120 MMHG | DIASTOLIC BLOOD PRESSURE: 77 MMHG

## 2021-06-10 DIAGNOSIS — E78.5 HYPERLIPIDEMIA WITH TARGET LDL LESS THAN 130: ICD-10-CM

## 2021-06-10 LAB
CHOLEST SERPL-MCNC: 128 MG/DL (ref 100–199)
FASTING STATUS PATIENT QL REPORTED: NORMAL
HDLC SERPL-MCNC: 38 MG/DL
LDLC SERPL CALC-MCNC: 67 MG/DL
TRIGL SERPL-MCNC: 117 MG/DL (ref 0–149)

## 2021-06-10 PROCEDURE — 80061 LIPID PANEL: CPT

## 2021-06-10 PROCEDURE — 36415 COLL VENOUS BLD VENIPUNCTURE: CPT

## 2021-06-14 ENCOUNTER — OFFICE VISIT (OUTPATIENT)
Dept: MEDICAL GROUP | Facility: PHYSICIAN GROUP | Age: 61
End: 2021-06-14
Payer: COMMERCIAL

## 2021-06-14 VITALS
HEART RATE: 84 BPM | RESPIRATION RATE: 16 BRPM | TEMPERATURE: 97.7 F | SYSTOLIC BLOOD PRESSURE: 120 MMHG | BODY MASS INDEX: 31.01 KG/M2 | HEIGHT: 63 IN | WEIGHT: 175 LBS | OXYGEN SATURATION: 96 % | DIASTOLIC BLOOD PRESSURE: 82 MMHG

## 2021-06-14 DIAGNOSIS — E78.5 HYPERLIPIDEMIA WITH TARGET LDL LESS THAN 130: ICD-10-CM

## 2021-06-14 DIAGNOSIS — K63.5 POLYP OF COLON, UNSPECIFIED PART OF COLON, UNSPECIFIED TYPE: ICD-10-CM

## 2021-06-14 DIAGNOSIS — R06.09 DYSPNEA ON EXERTION: ICD-10-CM

## 2021-06-14 DIAGNOSIS — I10 ESSENTIAL HYPERTENSION: ICD-10-CM

## 2021-06-14 PROCEDURE — 99213 OFFICE O/P EST LOW 20 MIN: CPT | Performed by: NURSE PRACTITIONER

## 2021-06-14 ASSESSMENT — FIBROSIS 4 INDEX: FIB4 SCORE: 0.9

## 2021-06-14 NOTE — PROGRESS NOTES
Chief Complaint   Patient presents with   • Follow-Up   • Hypertension       Subjective:   Laya Barnes is a 61 y.o. female here today for evaluation and management of:    Hyperlipidemia with target LDL less than 130  Results for LAYA BARNES (MRN [id number]) as of 6/14/2021 09:34   Ref. Range 6/18/2020 10:10 1/27/2021 15:58 6/10/2021 10:14   Cholesterol,Tot Latest Ref Range: 100 - 199 mg/dL 254 (H)  128   Triglycerides Latest Ref Range: 0 - 149 mg/dL 229 (H)  117   HDL Latest Ref Range: >=40 mg/dL 45  38 (A)   LDL Latest Ref Range: <100 mg/dL 163 (H)  67       Dyspnea on exertion  Has seen pulmonology and was cleared by them.  No meds or inhlaers.     HTN (hypertension)  Doing curves, pilates. bp on retake at 120/82.  Taking losartan 25 mg.  No dizziness reported.  bp log at home running at goal.     Colon polyps  Has colonoscopy appointment in July.          Current medicines (including changes today)  Current Outpatient Medications   Medication Sig Dispense Refill   • estradiol (ESTRACE) 0.1 MG/GM vaginal cream      • losartan (COZAAR) 25 MG Tab Take 1 tablet by mouth every day. 30 tablet 1   • atorvastatin (LIPITOR) 40 MG Tab Take 1 Tab by mouth every day. 90 Tab 3   • Cholecalciferol (VITAMIN D3 PO) Take 5,000 Units by mouth every day.     • triamcinolone acetonide (KENALOG) 0.1 % Ointment Apply 1 Application to affected area(s) 2 times a day. 3 Tube 3   • ibuprofen (MOTRIN) 800 MG Tab Take 1 Tab by mouth every 8 hours as needed for Inflammation. 60 Tab 1   • Calcium Carbonate-Vitamin D (CALCIUM + D PO) Take  by mouth every day.       No current facility-administered medications for this visit.     She  has a past medical history of Bilateral foot pain (11/9/2015), Bronchitis, Chickenpox, Colon polyps (6/11), Flying phobia (11/4/2011), Gasping for breath, Goiter, Hand eczema (8/4/2014), HTN (hypertension) (11/4/2011), Hyperlipidemia LDL goal < 130 (6/29/2012), Hypertension, Hypertriglyceridemia,  "Migraine, Pneumonia, Shortness of breath, and Wears glasses.    ROS as stated in hpi  No chest pain, no shortness of breath, no abdominal pain       Objective:     /82   Pulse 84   Temp 36.5 °C (97.7 °F) (Temporal)   Resp 16   Ht 1.6 m (5' 3\")   Wt 79.4 kg (175 lb)   SpO2 96%  Body mass index is 31 kg/m².   Physical Exam:  Constitutional: Alert, no distress.  Skin: Warm, dry, good turgor,no cyanosis, no rashes in visible areas.  Eye: Equal, round and reactive, conjunctiva clear, lids normal.  Neck: Trachea midline, no masses, no obvious thyroid enlargement.. No cervical or supraclavicular lymphadenopathy. Range of motion within normal limits.  Neuro: Cranial nerves 2-12 grossly intact.  No sensory deficit.  Respiratory: Unlabored respiratory effort,Psych: Alert and oriented x3, normal affect and mood and judgement.        Assessment and Plan:   The following treatment plan was discussed    1. Hyperlipidemia with target LDL less than 130  Chronic, ongoing, improved.  Continue with statin therapy.  Increase cardiovascular exercise.  Monitor and follo.w     2. Dyspnea on exertion  Chronic, ongoing. Stable.  Seen by pulmonology.  Post covid.  No asthma or restriction.  Monitor.     3. Essential hypertension  Chronic, ongoing, at goal.  Continue losartan 25 mg daily.  Continue log at home.  If elevation occurs, call and increase to 50 mg daily.  Return in 6 months.      4. Polyp of colon, unspecified part of colon, unspecified type  Due for colonoscopy.  Has appointment in July.  Monitor and follow.       Followup: Return in about 6 months (around 12/14/2021) for HTN.         Educated in proper administration of medication(s) ordered today including safety, possible SE, risks, benefits, rationale and alternatives to therapy.     Please note that this dictation was created using voice recognition software. I have made every reasonable attempt to correct obvious errors, but I expect that there are errors of " grammar and possibly content that I did not discover before finalizing the note.

## 2021-06-14 NOTE — ASSESSMENT & PLAN NOTE
Doing curves, pilates. bp on retake at 120/82.  Taking losartan 25 mg.  No dizziness reported.  bp log at home running at goal.

## 2021-06-14 NOTE — ASSESSMENT & PLAN NOTE
Results for NITIN KEARNEY (MRN [id number]) as of 6/14/2021 09:34   Ref. Range 6/18/2020 10:10 1/27/2021 15:58 6/10/2021 10:14   Cholesterol,Tot Latest Ref Range: 100 - 199 mg/dL 254 (H)  128   Triglycerides Latest Ref Range: 0 - 149 mg/dL 229 (H)  117   HDL Latest Ref Range: >=40 mg/dL 45  38 (A)   LDL Latest Ref Range: <100 mg/dL 163 (H)  67

## 2021-07-06 RX ORDER — LOSARTAN POTASSIUM 25 MG/1
TABLET ORAL
Qty: 30 TABLET | Refills: 0 | Status: SHIPPED | OUTPATIENT
Start: 2021-07-06 | End: 2021-08-02

## 2021-07-31 PROBLEM — K57.30 DIVERTICULOSIS OF COLON: Status: ACTIVE | Noted: 2021-07-31

## 2021-08-02 RX ORDER — LOSARTAN POTASSIUM 25 MG/1
TABLET ORAL
Qty: 90 TABLET | Refills: 3 | Status: SHIPPED | OUTPATIENT
Start: 2021-08-02 | End: 2022-08-25 | Stop reason: SDUPTHER

## 2021-08-02 NOTE — TELEPHONE ENCOUNTER
Requested Prescriptions     Signed Prescriptions Disp Refills   • losartan (COZAAR) 25 MG Tab 90 tablet 3     Sig: TAKE ONE TABLET BY MOUTH DAILY     Authorizing Provider: DANO WAYNE A.P.R.N.

## 2021-08-05 RX ORDER — ATORVASTATIN CALCIUM 40 MG/1
TABLET, FILM COATED ORAL
Qty: 90 TABLET | Refills: 3 | Status: SHIPPED | OUTPATIENT
Start: 2021-08-05 | End: 2022-08-25 | Stop reason: SDUPTHER

## 2021-08-05 NOTE — TELEPHONE ENCOUNTER
Requested Prescriptions     Signed Prescriptions Disp Refills   • atorvastatin (LIPITOR) 40 MG Tab 90 tablet 3     Sig: TAKE ONE TABLET BY MOUTH DAILY     Authorizing Provider: DANO WAYNE A.P.R.N.

## 2021-11-09 ENCOUNTER — OFFICE VISIT (OUTPATIENT)
Dept: MEDICAL GROUP | Facility: PHYSICIAN GROUP | Age: 61
End: 2021-11-09
Payer: COMMERCIAL

## 2021-11-09 VITALS
HEART RATE: 84 BPM | WEIGHT: 182 LBS | DIASTOLIC BLOOD PRESSURE: 78 MMHG | RESPIRATION RATE: 16 BRPM | BODY MASS INDEX: 32.25 KG/M2 | TEMPERATURE: 97.7 F | HEIGHT: 63 IN | SYSTOLIC BLOOD PRESSURE: 114 MMHG | OXYGEN SATURATION: 96 %

## 2021-11-09 DIAGNOSIS — I10 PRIMARY HYPERTENSION: ICD-10-CM

## 2021-11-09 DIAGNOSIS — M79.641 BILATERAL HAND PAIN: ICD-10-CM

## 2021-11-09 DIAGNOSIS — M79.642 BILATERAL HAND PAIN: ICD-10-CM

## 2021-11-09 PROBLEM — R06.09 DYSPNEA ON EXERTION: Status: RESOLVED | Noted: 2021-01-26 | Resolved: 2021-11-09

## 2021-11-09 PROCEDURE — 99212 OFFICE O/P EST SF 10 MIN: CPT | Performed by: NURSE PRACTITIONER

## 2021-11-09 RX ORDER — TETANUS TOXOID, REDUCED DIPHTHERIA TOXOID AND ACELLULAR PERTUSSIS VACCINE, ADSORBED 5; 2.5; 8; 8; 2.5 [IU]/.5ML; [IU]/.5ML; UG/.5ML; UG/.5ML; UG/.5ML
SUSPENSION INTRAMUSCULAR
COMMUNITY
End: 2022-08-25

## 2021-11-09 ASSESSMENT — FIBROSIS 4 INDEX: FIB4 SCORE: 0.9

## 2021-11-09 NOTE — ASSESSMENT & PLAN NOTE
bp at goal  114/78 today.  Taking losartan 25 mg daily.  Denies chest pain or shortness of breath.  Encouraged continued exercise program.

## 2021-11-10 NOTE — ASSESSMENT & PLAN NOTE
Reports daily discomfort.  Has been trying aspercream without great improvement.  Suspect osteoarthritis.  Discussed ES tylenol and adding voltaren gel.

## 2021-11-10 NOTE — PROGRESS NOTES
"Chief Complaint   Patient presents with   • Follow-Up   • Hypertension   • Arthritis       Subjective:   Laya Barnes is a 61 y.o. female here today for evaluation and management of:    HTN (hypertension)  bp at goal  114/78 today.  Taking losartan 25 mg daily.  Denies chest pain or shortness of breath.  Encouraged continued exercise program.     Bilateral hand pain  Reports daily discomfort.  Has been trying aspercream without great improvement.  Suspect osteoarthritis.  Discussed ES tylenol and adding voltaren gel.                      Objective:     /78 (BP Location: Left arm, Patient Position: Sitting)   Pulse 84   Temp 36.5 °C (97.7 °F) (Temporal)   Resp 16   Ht 1.6 m (5' 3\")   Wt 82.6 kg (182 lb)   SpO2 96%  Body mass index is 32.24 kg/m². stable.   Physical Exam:  Constitutional: Alert, no distress.  Skin: Warm, dry, good turgor,no cyanosis, no rashes in visible areas.  Eye: Equal, round and reactive, conjunctiva clear, lids normal.  Neuro: Cranial nerves 2-12 grossly intact.  No sensory deficit.  Respiratory: Unlabored respiratory effort,  MSK:  Bilateral thumb matacarpal discomfort, worsens at night. No swelling noted on examPsych: Alert and oriented x3, normal affect and mood and judgement.        Assessment and Plan:   The following treatment plan was discussed    1. Primary hypertension  Chronic, ongoing, stable.  Continue losartan daily.  Continue exercise program.     2. Bilateral hand pain  Chronic,ongoing, worsening.  Suspect osteoarthritis.  Discussed heat, ES tylenol and voltaren gel bid.  Monitor and follow.       Followup: No follow-ups on file.         Educated in proper administration of medication(s) ordered today including safety, possible SE, risks, benefits, rationale and alternatives to therapy.     Please note that this dictation was created using voice recognition software. I have made every reasonable attempt to correct obvious errors, but I expect that there are " errors of grammar and possibly content that I did not discover before finalizing the note.

## 2021-11-29 ENCOUNTER — APPOINTMENT (RX ONLY)
Dept: URBAN - METROPOLITAN AREA CLINIC 22 | Facility: CLINIC | Age: 61
Setting detail: DERMATOLOGY
End: 2021-11-29

## 2021-11-29 DIAGNOSIS — L82.1 OTHER SEBORRHEIC KERATOSIS: ICD-10-CM

## 2021-11-29 DIAGNOSIS — D22 MELANOCYTIC NEVI: ICD-10-CM

## 2021-11-29 DIAGNOSIS — Z71.89 OTHER SPECIFIED COUNSELING: ICD-10-CM

## 2021-11-29 DIAGNOSIS — L82.0 INFLAMED SEBORRHEIC KERATOSIS: ICD-10-CM

## 2021-11-29 DIAGNOSIS — L81.4 OTHER MELANIN HYPERPIGMENTATION: ICD-10-CM

## 2021-11-29 PROBLEM — D22.5 MELANOCYTIC NEVI OF TRUNK: Status: ACTIVE | Noted: 2021-11-29

## 2021-11-29 PROBLEM — D48.5 NEOPLASM OF UNCERTAIN BEHAVIOR OF SKIN: Status: ACTIVE | Noted: 2021-11-29

## 2021-11-29 PROCEDURE — ? COUNSELING

## 2021-11-29 PROCEDURE — ? SUNSCREEN TREATMENT REGIMEN

## 2021-11-29 PROCEDURE — 11102 TANGNTL BX SKIN SINGLE LES: CPT

## 2021-11-29 PROCEDURE — ? BIOPSY BY SHAVE METHOD

## 2021-11-29 PROCEDURE — 99213 OFFICE O/P EST LOW 20 MIN: CPT | Mod: 25

## 2021-11-29 ASSESSMENT — LOCATION ZONE DERM
LOCATION ZONE: FACE
LOCATION ZONE: TRUNK
LOCATION ZONE: NECK
LOCATION ZONE: ARM

## 2021-11-29 ASSESSMENT — LOCATION SIMPLE DESCRIPTION DERM
LOCATION SIMPLE: RIGHT CHEEK
LOCATION SIMPLE: LEFT ANTERIOR NECK
LOCATION SIMPLE: RIGHT FOREARM
LOCATION SIMPLE: UPPER BACK
LOCATION SIMPLE: CHEST
LOCATION SIMPLE: LEFT FOREARM
LOCATION SIMPLE: INFERIOR FOREHEAD
LOCATION SIMPLE: LEFT UPPER BACK

## 2021-11-29 ASSESSMENT — LOCATION DETAILED DESCRIPTION DERM
LOCATION DETAILED: LEFT VENTRAL PROXIMAL FOREARM
LOCATION DETAILED: LEFT INFERIOR ANTERIOR NECK
LOCATION DETAILED: MIDDLE STERNUM
LOCATION DETAILED: SUPERIOR THORACIC SPINE
LOCATION DETAILED: RIGHT SUPERIOR MEDIAL MALAR CHEEK
LOCATION DETAILED: LEFT SUPERIOR UPPER BACK
LOCATION DETAILED: INFERIOR MID FOREHEAD
LOCATION DETAILED: RIGHT VENTRAL PROXIMAL FOREARM
LOCATION DETAILED: INFERIOR THORACIC SPINE

## 2022-07-14 ENCOUNTER — HOSPITAL ENCOUNTER (OUTPATIENT)
Dept: RADIOLOGY | Facility: MEDICAL CENTER | Age: 62
End: 2022-07-14
Attending: NURSE PRACTITIONER
Payer: COMMERCIAL

## 2022-07-14 DIAGNOSIS — Z12.31 VISIT FOR SCREENING MAMMOGRAM: ICD-10-CM

## 2022-07-14 PROCEDURE — 77063 BREAST TOMOSYNTHESIS BI: CPT

## 2022-08-24 NOTE — PROGRESS NOTES
Subjective:     Laya Barnes is a 60 y.o. female who presents for Shortness of Breath (Pt sts she tested postive for Covid for October and has been SOB since. )       Shortness of Breath  This is a new problem. The problem has been unchanged. Pertinent negatives include no chest pain or fever.     Patient reporting shortness of breath with exertion ever since she tested positive for Covid last September.  Denies a history of asthma.  Denies tobacco use.    Patient had a virtual visit with her PCP yesterday.  She was advised to go to urgent care for further evaluation and treatment.    Unique result dated 9/22/2020 reviewed: SARS-CoV-2 positive.    Unique result dated 11/15/2020 reviewed: SARS-CoV-2 negative.      Patient was screened prior to rooming and denied COVID-19 diagnosis or contact with a person who has been diagnosed or is suspected to have COVID-19. During this visit, appropriate PPE was worn, hand hygiene was performed, and the patient and any visitors were masked.     PMH:  has a past medical history of Bilateral foot pain (11/9/2015), Colon polyps (6/11), Flying phobia (11/4/2011), Goiter, Hand eczema (8/4/2014), HTN (hypertension) (11/4/2011), Hyperlipidemia LDL goal < 130 (6/29/2012), Hypertension, Hypertriglyceridemia, and Migraine.    MEDS:   Current Outpatient Medications:   •  tetanus-diphth-acell pertussis (BOOSTRIX, AGES 7 & OLDER,) 5-2.5-18.5 LF-MCG/0.5 Suspension, Boostrix Tdap 2.5 Lf unit-8 mcg-5 Lf/0.5 mL intramuscular syringe  ADM 0.5ML IM UTD, Disp: , Rfl:   •  atorvastatin (LIPITOR) 40 MG Tab, Take 1 Tab by mouth every day., Disp: 90 Tab, Rfl: 3  •  Cholecalciferol (VITAMIN D3 PO), Take  by mouth., Disp: , Rfl:   •  triamcinolone acetonide (KENALOG) 0.1 % Ointment, Apply 1 Application to affected area(s) 2 times a day., Disp: 3 Tube, Rfl: 3  •  fluticasone (FLONASE) 50 MCG/ACT nasal spray, Spray 1 Spray in nose every day., Disp: 16 g, Rfl: 0  •  ibuprofen (MOTRIN) 800 MG Tab, Take  "1 Tab by mouth every 8 hours as needed for Inflammation., Disp: 60 Tab, Rfl: 1  •  docosahexanoic acid (OMEGA 3 FA) 1000 MG CAPS, Take 1,000 mg by mouth 3 times a day, with meals., Disp: , Rfl:   •  Calcium Carbonate-Vitamin D (CALCIUM + D PO), Take  by mouth every day., Disp: , Rfl:     ALLERGIES: No Known Allergies    SURGHX:   Past Surgical History:   Procedure Laterality Date   • MASS EXCISION ORTHO  2013    Performed by Gigi [facility]ung M.D. at [facility]   • TENDON REPAIR  2013    Performed by Gigi Chung M.D. at [facility]   • COLONOSCOPY     • PB  DELIVERY ONLY     • TONSILLECTOMY AND ADENOIDECTOMY       SOCHX:  reports that she has never smoked. She has never used smokeless tobacco. She reports current alcohol use. She reports that she does not use drugs.     FH: Reviewed with patient, not pertinent to this visit.    Review of Systems   Constitutional: Negative.  Negative for chills, fever and malaise/fatigue.   HENT: Negative.    Respiratory: Positive for shortness of breath. Negative for cough.    Cardiovascular: Negative.  Negative for chest pain.   All other systems reviewed and are negative.    Additional details per HPI.      Objective:     /90 (BP Location: Left arm, Patient Position: Sitting, BP Cuff Size: Adult)   Pulse 88   Temp 37 °C (98.6 °F) (Temporal)   Resp 16   Ht 1.6 m (5' 3\")   Wt 77.1 kg (170 lb)   SpO2 96%   BMI 30.11 kg/m²     Physical Exam  Vitals signs reviewed.   Constitutional:       General: She is not in acute distress.     Appearance: She is well-developed. She is not ill-appearing or toxic-appearing.   HENT:      Head: Normocephalic.      Right Ear: External ear normal.      Left Ear: External ear normal.   Eyes:      General: Vision grossly intact.      Extraocular Movements: Extraocular movements intact.      Conjunctiva/sclera: Conjunctivae normal.   Neck:      Musculoskeletal: Normal range of motion. "   Cardiovascular:      Rate and Rhythm: Normal rate and regular rhythm.      Heart sounds: Normal heart sounds.   Pulmonary:      Effort: Pulmonary effort is normal. No respiratory distress.      Breath sounds: No decreased breath sounds, wheezing, rhonchi or rales.   Abdominal:      General: Bowel sounds are normal.   Musculoskeletal: Normal range of motion.         General: No deformity.   Skin:     General: Skin is warm and dry.      Coloration: Skin is not pale.   Neurological:      Mental Status: She is alert and oriented to person, place, and time.      Sensory: No sensory deficit.      Motor: No weakness.      Coordination: Coordination normal.   Psychiatric:         Behavior: Behavior normal. Behavior is cooperative.     Chest x-ray:    Details    Reading Physician Reading Date Result Priority   Kaye Brandon M.D.  [phone] 1/27/2021 Urgent Care      Narrative & Impression        1/27/2021 4:15 PM     HISTORY/REASON FOR EXAM:  Shortness of breath with history of Covid 19 positivity.     TECHNIQUE/EXAM DESCRIPTION AND NUMBER OF VIEWS:  Two views of the chest.     COMPARISON:  None available.     FINDINGS:     The mediastinal and cardiac silhouette is unremarkable.     The pulmonary vascularity is within normal limits.     The lung parenchyma is clear.     There is no significant pleural effusion.     There is no visible pneumothorax.     There are no acute bony abnormalities.     IMPRESSION:     1.  Unremarkable two view chest.             Last Resulted: 01/27/21  4:29 PM        Radiology report and images reviewed by myself. Concur with findings.    EKG: NSR 77, no acute ST abnormalities      Assessment/Plan:     1. SOB (shortness of breath)  - D-DIMER; Future  - CBC WITH DIFFERENTIAL; Future  - Comp Metabolic Panel; Future  - ESTIMATED GFR; Future  - EKG  - DX-CHEST-2 VIEWS; Future    2. History of COVID-19  - DX-CHEST-2 VIEWS; Future    Labs pending to further evaluate symptoms.    If workup remains  unremarkable, may consider referral to pulmonology. Advised patient to continue to monitor oxygen saturation at home. Warning signs reviewed. Return/ER precautions advised.      Differential diagnosis, natural history, supportive care, over-the-counter symptom management per 's instructions, close monitoring, and indications for immediate follow-up discussed.     Patient advised to: Return for 1) Symptoms that worsen/don't improve, or go to ER, 2) Follow up with primary care in 7-10 days.    All questions answered. Patient agrees with the plan of care.   Double O-Z Flap Text: The defect edges were debeveled with a #15 scalpel blade.  Given the location of the defect, shape of the defect and the proximity to free margins a Double O-Z flap was deemed most appropriate.  Using a sterile surgical marker, an appropriate transposition flap was drawn incorporating the defect and placing the expected incisions within the relaxed skin tension lines where possible. The area thus outlined was incised deep to adipose tissue with a #15 scalpel blade.  The skin margins were undermined to an appropriate distance in all directions utilizing iris scissors.

## 2022-08-25 ENCOUNTER — OFFICE VISIT (OUTPATIENT)
Dept: MEDICAL GROUP | Facility: PHYSICIAN GROUP | Age: 62
End: 2022-08-25
Payer: COMMERCIAL

## 2022-08-25 ENCOUNTER — HOSPITAL ENCOUNTER (OUTPATIENT)
Dept: LAB | Facility: MEDICAL CENTER | Age: 62
End: 2022-08-25
Attending: NURSE PRACTITIONER
Payer: COMMERCIAL

## 2022-08-25 VITALS
OXYGEN SATURATION: 97 % | TEMPERATURE: 98.3 F | DIASTOLIC BLOOD PRESSURE: 68 MMHG | HEART RATE: 100 BPM | WEIGHT: 183 LBS | BODY MASS INDEX: 32.43 KG/M2 | SYSTOLIC BLOOD PRESSURE: 126 MMHG | HEIGHT: 63 IN

## 2022-08-25 DIAGNOSIS — G43.109 MIGRAINE WITH AURA AND WITHOUT STATUS MIGRAINOSUS, NOT INTRACTABLE: ICD-10-CM

## 2022-08-25 DIAGNOSIS — E78.5 HYPERLIPIDEMIA WITH TARGET LDL LESS THAN 130: ICD-10-CM

## 2022-08-25 DIAGNOSIS — I10 PRIMARY HYPERTENSION: ICD-10-CM

## 2022-08-25 DIAGNOSIS — R73.03 PREDIABETES: ICD-10-CM

## 2022-08-25 DIAGNOSIS — R42 VERTIGO: ICD-10-CM

## 2022-08-25 LAB
CHOLEST SERPL-MCNC: 148 MG/DL (ref 100–199)
FASTING STATUS PATIENT QL REPORTED: NORMAL
HBA1C MFR BLD: 5.8 % (ref 0–5.6)
HDLC SERPL-MCNC: 41 MG/DL
INT CON NEG: ABNORMAL
INT CON POS: ABNORMAL
LDLC SERPL CALC-MCNC: 71 MG/DL
TRIGL SERPL-MCNC: 181 MG/DL (ref 0–149)

## 2022-08-25 PROCEDURE — 99214 OFFICE O/P EST MOD 30 MIN: CPT | Performed by: NURSE PRACTITIONER

## 2022-08-25 PROCEDURE — 36415 COLL VENOUS BLD VENIPUNCTURE: CPT

## 2022-08-25 PROCEDURE — 83036 HEMOGLOBIN GLYCOSYLATED A1C: CPT | Performed by: NURSE PRACTITIONER

## 2022-08-25 PROCEDURE — 80061 LIPID PANEL: CPT

## 2022-08-25 RX ORDER — LOSARTAN POTASSIUM 25 MG/1
25 TABLET ORAL DAILY
Qty: 90 TABLET | Refills: 3 | Status: SHIPPED | OUTPATIENT
Start: 2022-08-25 | End: 2023-09-05 | Stop reason: SDUPTHER

## 2022-08-25 RX ORDER — METFORMIN HYDROCHLORIDE 500 MG/1
TABLET, EXTENDED RELEASE ORAL
COMMUNITY
Start: 2022-07-05 | End: 2022-08-27

## 2022-08-25 RX ORDER — ATORVASTATIN CALCIUM 40 MG/1
40 TABLET, FILM COATED ORAL DAILY
Qty: 90 TABLET | Refills: 3 | Status: SHIPPED | OUTPATIENT
Start: 2022-08-25 | End: 2023-12-17

## 2022-08-25 RX ORDER — MECLIZINE HCL 12.5 MG/1
12.5 TABLET ORAL 3 TIMES DAILY PRN
Qty: 30 TABLET | Refills: 0 | Status: SHIPPED | OUTPATIENT
Start: 2022-08-25

## 2022-08-25 ASSESSMENT — PATIENT HEALTH QUESTIONNAIRE - PHQ9: CLINICAL INTERPRETATION OF PHQ2 SCORE: 0

## 2022-08-25 ASSESSMENT — FIBROSIS 4 INDEX: FIB4 SCORE: 0.92

## 2022-08-25 NOTE — LETTER
[employer]  VALARIE BrooksP.  [address]  [place] [address]  Fax: [phone]   Authorization for Release/Disclosure of   Protected Health Information   Name: LAYA BARNES : 1960 SSN: [id number]   Address: [facility] [address] [place] [address] Phone:    [phone] (home)    I authorize the entity listed below to release/disclose the PHI below to:   [employer]/Tamiko Ortiz D.N.P. and Tamiko Ortiz D.N.P.   Provider or Entity Name:  [facility]   Address   City, State, Zip   Phone:      Fax:     Reason for request: continuity of care   Information to be released:    [  ] LAST COLONOSCOPY,  including any PATH REPORT and follow-up  [  ] LAST FIT/COLOGUARD RESULT [  ] LAST DEXA  [  ] LAST MAMMOGRAM  [  ] LAST PAP  [x  ] LAST LABS [  ] RETINA EXAM REPORT  [  ] IMMUNIZATION RECORDS  [  x] Release all info      [  ] Check here and initial the line next to each item to release ALL health information INCLUDING  _____ Care and treatment for drug and / or alcohol abuse  _____ HIV testing, infection status, or AIDS  _____ Genetic Testing    DATES OF SERVICE OR TIME PERIOD TO BE DISCLOSED: _____________  I understand and acknowledge that:  * This Authorization may be revoked at any time by you in writing, except if your health information has already been used or disclosed.  * Your health information that will be used or disclosed as a result of you signing this authorization could be re-disclosed by the recipient. If this occurs, your re-disclosed health information may no longer be protected by State or Federal laws.  * You may refuse to sign this Authorization. Your refusal will not affect your ability to obtain treatment.  * This Authorization becomes effective upon signing and will  on (date) __________.      If no date is indicated, this Authorization will  one (1) year from the signature date.    Name: Laya Barnes    Signature:   Date:     2022       PLEASE FAX  REQUESTED RECORDS BACK TO: [phone]

## 2022-08-25 NOTE — PROGRESS NOTES
Subjective:     CC:    Chief Complaint   Patient presents with    Establish Care    Hypertension    Labs Only        HISTORY OF THE PRESENT ILLNESS: Patient is a 62 y.o. female, here today to establish care. Prior PCP was Simran Lane & then PCP at [facility] . The below problems were discussed/reviewed at this visit:    Problem   Prediabetes    States was started on Metformin ER 500mg BID in June for elevated fasting glucose. She took only for 2 weeks and stopped due to GI upset.     Vertigo    Reports chronic, intermittent vertigo for several years.   Prn Meclizine 12.5mg     Female Bladder Prolapse    Managed by Dr Ly.   Laser therapy annually  Estradiol vag cream 0.1%      Hyperlipidemia With Target Ldl Less Than 130    Taking Atorvastatin 40mg QD     Htn (Hypertension)    Diagnosed HTN around around 2019  Currently taking Losartan 25mg QD     Migraine With Aura and Without Status Migrainosus, Not Intractable    Chronic, frequency very rare. Every several months  Managed on prn advil         Patient Active Problem List   Diagnosis    Goiter    Migraine with aura and without status migrainosus, not intractable    HTN (hypertension)    Colon polyps    Hyperlipidemia with target LDL less than 130    DDD (degenerative disc disease), cervical    DDD (degenerative disc disease), lumbar    Female bladder prolapse    Sun-damaged skin    History of 2019 novel coronavirus disease (COVID-19)    Diverticulosis of colon    Bilateral hand pain    Prediabetes    Vertigo       Past Medical History:   Diagnosis Date    Bilateral foot pain 11/9/2015    Bronchitis     Chickenpox     Colon polyps 6/11    Tubular Adenoma    Flying phobia 11/4/2011    Gasping for breath     Goiter     Hand eczema 8/4/2014    HTN (hypertension) 11/4/2011    Hyperlipidemia LDL goal < 130 6/29/2012    Hypertension     Hypertriglyceridemia     Migraine     Pneumonia     Shortness of breath     Wears glasses         Current Outpatient Medications  "Ordered in [facility]   Medication Sig Dispense Refill    losartan (COZAAR) 25 MG Tab Take 1 Tablet by mouth every day. 90 Tablet 3    atorvastatin (LIPITOR) 40 MG Tab Take 1 Tablet by mouth every day. 90 Tablet 3    meclizine (ANTIVERT) 12.5 MG Tab Take 1 Tablet by mouth 3 times a day as needed for Dizziness. 30 Tablet 0    estradiol (ESTRACE) 0.1 MG/GM vaginal cream       Cholecalciferol (VITAMIN D3 PO) Take 5,000 Units by mouth every day.      triamcinolone acetonide (KENALOG) 0.1 % Ointment Apply 1 Application to affected area(s) 2 times a day. 3 Tube 3    ibuprofen (MOTRIN) 800 MG Tab Take 1 Tab by mouth every 8 hours as needed for Inflammation. 60 Tab 1    Calcium Carbonate-Vitamin D (CALCIUM + D PO) Take  by mouth every day.       No current [facility]-ordered facility-administered medications on file.        Past Surgical History:   Procedure Laterality Date    MASS EXCISION ORTHO  2013    Performed by Gigi Chung M.D. at SURGERY [facility] ORS    TENDON REPAIR  2013    Performed by Gigi Chung M.D. at [facility] ORS    [place]OSCOPY      [place]  DELIVERY [place]      PRIMARY C SECTION      TONSILLECTOMY      TONSILLECTOMY AND ADENOIDECTOMY          Allergies:  Patient has no known allergies.    Health Maintenance: Completed  - menopause age early 50s;     ROS:   Review of Systems   Constitutional: Negative.  Negative for fever and malaise/fatigue.   HENT: Negative.     Eyes: Negative.    Respiratory:  Negative for cough, sputum production and shortness of breath.    Cardiovascular:  Negative for chest pain, palpitations and leg swelling.   Gastrointestinal: Negative.    Genitourinary: Negative.    Musculoskeletal: Negative.    Neurological: Negative.    Endo/Heme/Allergies: Negative.    Psychiatric/Behavioral: Negative.         Objective:     Exam: /68   Pulse 100   Temp 36.8 °C (98.3 °F) (Temporal)   Ht 1.6 m (5' 3\")   Wt 83 kg (183 lb)   SpO2 97%  Body mass index is 32.42 " kg/m².    Physical Exam  Constitutional:       Appearance: Normal appearance.   Cardiovascular:      Rate and Rhythm: Normal rate and regular rhythm.      Pulses: Normal pulses.      Heart sounds: Normal heart sounds.   Pulmonary:      Effort: Pulmonary effort is normal.      Breath sounds: Normal breath sounds.   Musculoskeletal:         General: Normal range of motion.      Cervical back: Normal range of motion and neck supple.   Skin:     General: Skin is warm and dry.   Neurological:      General: No focal deficit present.      Mental Status: She is alert and oriented to person, place, and time.   Psychiatric:         Mood and Affect: Mood normal.         Behavior: Behavior normal.         Thought Content: Thought content normal.         Judgment: Judgment normal.     Labs: reviewed with patient    Assessment & Plan:   62 y.o. female with the following -    Problem List Items Addressed This Visit       Migraine with aura and without status migrainosus, not intractable     Symptoms managed with current treatment plan  - prn advil   - she will let me know if it becomes more frequent         Relevant Medications    losartan (COZAAR) 25 MG Tab    atorvastatin (LIPITOR) 40 MG Tab    HTN (hypertension)     BP in clinic today 126/68; no CP, palpitations, dizziness  - continue Losartan 25mg QD  - monitor annual CMP/fasting lipid         Relevant Medications    losartan (COZAAR) 25 MG Tab    atorvastatin (LIPITOR) 40 MG Tab    Hyperlipidemia with target LDL less than 130      Latest Reference Range & Units 6/10/21 10:14   Cholesterol,Tot 100 - 199 mg/dL 128   Triglycerides 0 - 149 mg/dL 117   HDL >=40 mg/dL 38 !   LDL <100 mg/dL 67   The 10-year ASCVD risk score (Anaya DOW Jr., et al., 2013) is: 5.1%   No myalgia  - continue Atorvastatin 40mg QD  - monitor annual fasting lipid; ordered today         Relevant Medications    losartan (COZAAR) 25 MG Tab    atorvastatin (LIPITOR) 40 MG Tab    Other Relevant Orders    Lipid  Profile (Completed)    Prediabetes     A1c today in clinic is 5.8  - d/c metformin since she did not tolerate  - she will work on dietary changes, eliminated processed carbs/sugars  - monitor A1c Q3-6 months         Relevant Orders    POCT  A1C (Completed)    Vertigo    Relevant Medications    meclizine (ANTIVERT) 12.5 MG Tab       Medications Prescribed Today:  2. Primary hypertension  - losartan (COZAAR) 25 MG Tab; Take 1 Tablet by mouth every day.  Dispense: 90 Tablet; Refill: 3    3. Hyperlipidemia with target LDL less than 130  - atorvastatin (LIPITOR) 40 MG Tab; Take 1 Tablet by mouth every day.  Dispense: 90 Tablet; Refill: 3    4. Vertigo  - meclizine (ANTIVERT) 12.5 MG Tab; Take 1 Tablet by mouth 3 times a day as needed for Dizziness.  Dispense: 30 Tablet; Refill: 0    Educated in proper administration of medication(s) ordered today including safety, possible SE, risks, benefits, rationale and alternatives to therapy.     Return in about 3 months (around 11/25/2022) for Dm2, A1c.    Please note that this dictation was created using voice recognition software. I have made every reasonable attempt to correct obvious errors, but I expect that there are errors of grammar and possibly content that I did not discover before finalizing the note.

## 2022-08-27 PROBLEM — R42 VERTIGO: Status: ACTIVE | Noted: 2022-08-27

## 2022-08-27 PROBLEM — R73.03 PREDIABETES: Status: ACTIVE | Noted: 2022-08-27

## 2022-08-27 ASSESSMENT — ENCOUNTER SYMPTOMS
PALPITATIONS: 0
PSYCHIATRIC NEGATIVE: 1
COUGH: 0
NEUROLOGICAL NEGATIVE: 1
EYES NEGATIVE: 1
FEVER: 0
MUSCULOSKELETAL NEGATIVE: 1
CONSTITUTIONAL NEGATIVE: 1
GASTROINTESTINAL NEGATIVE: 1
SHORTNESS OF BREATH: 0
SPUTUM PRODUCTION: 0

## 2022-08-28 NOTE — ASSESSMENT & PLAN NOTE
A1c today in clinic is 5.8  - d/c metformin since she did not tolerate  - she will work on dietary changes, eliminated processed carbs/sugars  - monitor A1c Q3-6 months

## 2022-08-28 NOTE — ASSESSMENT & PLAN NOTE
BP in clinic today 126/68; no CP, palpitations, dizziness  - continue Losartan 25mg QD  - monitor annual CMP/fasting lipid

## 2022-08-28 NOTE — ASSESSMENT & PLAN NOTE
Symptoms managed with current treatment plan  - prn advil   - she will let me know if it becomes more frequent

## 2022-08-28 NOTE — ASSESSMENT & PLAN NOTE
Latest Reference Range & Units 6/10/21 10:14   Cholesterol,Tot 100 - 199 mg/dL 128   Triglycerides 0 - 149 mg/dL 117   HDL >=40 mg/dL 38 !   LDL <100 mg/dL 67     The 10-year ASCVD risk score (Anaya DOW Jr., et al., 2013) is: 5.1%   No myalgia  - continue Atorvastatin 40mg QD  - monitor annual fasting lipid; ordered today

## 2022-10-19 ENCOUNTER — OFFICE VISIT (OUTPATIENT)
Dept: URGENT CARE | Facility: PHYSICIAN GROUP | Age: 62
End: 2022-10-19
Payer: COMMERCIAL

## 2022-10-19 VITALS
HEIGHT: 63 IN | SYSTOLIC BLOOD PRESSURE: 130 MMHG | TEMPERATURE: 96.7 F | WEIGHT: 186 LBS | DIASTOLIC BLOOD PRESSURE: 72 MMHG | BODY MASS INDEX: 32.96 KG/M2 | RESPIRATION RATE: 16 BRPM | HEART RATE: 96 BPM | OXYGEN SATURATION: 95 %

## 2022-10-19 DIAGNOSIS — J01.00 ACUTE NON-RECURRENT MAXILLARY SINUSITIS: ICD-10-CM

## 2022-10-19 PROCEDURE — 99213 OFFICE O/P EST LOW 20 MIN: CPT | Performed by: PHYSICIAN ASSISTANT

## 2022-10-19 RX ORDER — FLUTICASONE PROPIONATE 50 MCG
2 SPRAY, SUSPENSION (ML) NASAL DAILY
Qty: 16 G | Refills: 0 | Status: SHIPPED | OUTPATIENT
Start: 2022-10-19

## 2022-10-19 RX ORDER — METHYLPREDNISOLONE 4 MG/1
TABLET ORAL
Qty: 21 TABLET | Refills: 0 | Status: SHIPPED | OUTPATIENT
Start: 2022-10-19 | End: 2023-05-07

## 2022-10-19 ASSESSMENT — FIBROSIS 4 INDEX: FIB4 SCORE: 0.92

## 2022-12-12 ENCOUNTER — APPOINTMENT (RX ONLY)
Dept: URBAN - METROPOLITAN AREA CLINIC 22 | Facility: CLINIC | Age: 62
Setting detail: DERMATOLOGY
End: 2022-12-12

## 2022-12-12 DIAGNOSIS — L81.4 OTHER MELANIN HYPERPIGMENTATION: ICD-10-CM

## 2022-12-12 DIAGNOSIS — Z71.89 OTHER SPECIFIED COUNSELING: ICD-10-CM

## 2022-12-12 DIAGNOSIS — D22 MELANOCYTIC NEVI: ICD-10-CM

## 2022-12-12 DIAGNOSIS — L82.0 INFLAMED SEBORRHEIC KERATOSIS: ICD-10-CM

## 2022-12-12 DIAGNOSIS — L82.1 OTHER SEBORRHEIC KERATOSIS: ICD-10-CM

## 2022-12-12 PROBLEM — D22.5 MELANOCYTIC NEVI OF TRUNK: Status: ACTIVE | Noted: 2022-12-12

## 2022-12-12 PROCEDURE — 99213 OFFICE O/P EST LOW 20 MIN: CPT | Mod: 25

## 2022-12-12 PROCEDURE — ? SUNSCREEN TREATMENT REGIMEN

## 2022-12-12 PROCEDURE — 17110 DESTRUCTION B9 LES UP TO 14: CPT

## 2022-12-12 PROCEDURE — ? LIQUID NITROGEN

## 2022-12-12 PROCEDURE — ? COUNSELING

## 2022-12-12 ASSESSMENT — LOCATION SIMPLE DESCRIPTION DERM
LOCATION SIMPLE: LEFT ANTERIOR NECK
LOCATION SIMPLE: CHEST
LOCATION SIMPLE: RIGHT FOREARM
LOCATION SIMPLE: RIGHT SHOULDER
LOCATION SIMPLE: RIGHT CHEEK
LOCATION SIMPLE: INFERIOR FOREHEAD
LOCATION SIMPLE: UPPER BACK
LOCATION SIMPLE: LEFT FOREARM

## 2022-12-12 ASSESSMENT — LOCATION DETAILED DESCRIPTION DERM
LOCATION DETAILED: LEFT INFERIOR ANTERIOR NECK
LOCATION DETAILED: INFERIOR THORACIC SPINE
LOCATION DETAILED: LEFT LATERAL SUPERIOR CHEST
LOCATION DETAILED: RIGHT ANTERIOR SHOULDER
LOCATION DETAILED: MIDDLE STERNUM
LOCATION DETAILED: RIGHT VENTRAL PROXIMAL FOREARM
LOCATION DETAILED: SUPERIOR THORACIC SPINE
LOCATION DETAILED: RIGHT SUPERIOR MEDIAL MALAR CHEEK
LOCATION DETAILED: INFERIOR MID FOREHEAD
LOCATION DETAILED: LEFT VENTRAL PROXIMAL FOREARM

## 2022-12-12 ASSESSMENT — LOCATION ZONE DERM
LOCATION ZONE: NECK
LOCATION ZONE: ARM
LOCATION ZONE: TRUNK
LOCATION ZONE: FACE

## 2022-12-12 NOTE — PROCEDURE: LIQUID NITROGEN
Detail Level: Detailed
Spray Paint Text: The liquid nitrogen was applied to the skin utilizing a spray paint frosting technique.
Show Applicator Variable?: Yes
Medical Necessity Information: It is in your best interest to select a reason for this procedure from the list below. All of these items fulfill various CMS LCD requirements except the new and changing color options.
Post-Care Instructions: I reviewed with the patient in detail post-care instructions. Patient is to wear sunprotection, and avoid picking at any of the treated lesions. Pt may apply Vaseline to crusted or scabbing areas.
Consent: The patient's consent was obtained including but not limited to risks of crusting, scabbing, blistering, scarring, darker or lighter pigmentary change, recurrence, incomplete removal and infection.
Include Z78.9 (Other Specified Conditions Influencing Health Status) As An Associated Diagnosis?: No
Medical Necessity Clause: This procedure was medically necessary because the lesions that were treated were:

## 2023-05-05 ENCOUNTER — OFFICE VISIT (OUTPATIENT)
Dept: MEDICAL GROUP | Facility: PHYSICIAN GROUP | Age: 63
End: 2023-05-05
Payer: COMMERCIAL

## 2023-05-05 VITALS
SYSTOLIC BLOOD PRESSURE: 120 MMHG | WEIGHT: 166 LBS | TEMPERATURE: 97.3 F | DIASTOLIC BLOOD PRESSURE: 78 MMHG | HEART RATE: 82 BPM | OXYGEN SATURATION: 98 % | HEIGHT: 63 IN | BODY MASS INDEX: 29.41 KG/M2 | RESPIRATION RATE: 14 BRPM

## 2023-05-05 DIAGNOSIS — Z11.59 NEED FOR HEPATITIS C SCREENING TEST: ICD-10-CM

## 2023-05-05 DIAGNOSIS — E78.5 HYPERLIPIDEMIA WITH TARGET LDL LESS THAN 130: ICD-10-CM

## 2023-05-05 DIAGNOSIS — R42 VERTIGO: ICD-10-CM

## 2023-05-05 DIAGNOSIS — R73.03 PREDIABETES: ICD-10-CM

## 2023-05-05 DIAGNOSIS — I10 PRIMARY HYPERTENSION: Chronic | ICD-10-CM

## 2023-05-05 DIAGNOSIS — Z13.228 SCREENING FOR METABOLIC DISORDER: ICD-10-CM

## 2023-05-05 DIAGNOSIS — L30.8 OTHER ECZEMA: ICD-10-CM

## 2023-05-05 DIAGNOSIS — E55.9 VITAMIN D DEFICIENCY: ICD-10-CM

## 2023-05-05 LAB
HBA1C MFR BLD: 5.7 % (ref ?–5.8)
POCT INT CON NEG: NEGATIVE
POCT INT CON POS: POSITIVE

## 2023-05-05 PROCEDURE — 83036 HEMOGLOBIN GLYCOSYLATED A1C: CPT | Performed by: NURSE PRACTITIONER

## 2023-05-05 PROCEDURE — 99214 OFFICE O/P EST MOD 30 MIN: CPT | Performed by: NURSE PRACTITIONER

## 2023-05-05 RX ORDER — TRIAMCINOLONE ACETONIDE 1 MG/G
1 OINTMENT TOPICAL 2 TIMES DAILY
Qty: 30 G | Refills: 1 | Status: SHIPPED | OUTPATIENT
Start: 2023-05-05

## 2023-05-05 RX ORDER — SCOLOPAMINE TRANSDERMAL SYSTEM 1 MG/1
1 PATCH, EXTENDED RELEASE TRANSDERMAL
Qty: 4 PATCH | Refills: 1 | Status: SHIPPED | OUTPATIENT
Start: 2023-05-05

## 2023-05-05 ASSESSMENT — PATIENT HEALTH QUESTIONNAIRE - PHQ9: CLINICAL INTERPRETATION OF PHQ2 SCORE: 0

## 2023-05-05 NOTE — PROGRESS NOTES
Subjective       CC:   Chief Complaint   Patient presents with    Diabetes Follow-up        HPI:   Patient is a 63 y.o. established female patient with medical history listed below here today for evaluation and management of hypertension, hyperlipidemia, prediabetes. We discontinued metformin at the last visit due to intolerance. She is going on a cruise & is requesting scopolamine patch for vertigo. She also needs refill on triamcinolone cream for her eczema flare up.     Problem   Eczema   Vitamin D Deficiency   Prediabetes    States was started on Metformin ER 500mg BID in June for elevated fasting glucose. She took only for 2 weeks and stopped due to GI upset.     Vertigo    Reports chronic, intermittent vertigo for several years.   Managed with prn Meclizine 12.5mg     Hyperlipidemia With Target Ldl Less Than 130    Taking Atorvastatin 40mg QD     Htn (Hypertension)    Diagnosed HTN around around 2019  Currently taking Losartan 25mg QD         Patient Active Problem List   Diagnosis    Goiter    Migraine with aura and without status migrainosus, not intractable    HTN (hypertension)    Colon polyps    Hyperlipidemia with target LDL less than 130    DDD (degenerative disc disease), cervical    DDD (degenerative disc disease), lumbar    Female bladder prolapse    Sun-damaged skin    History of 2019 novel coronavirus disease (COVID-19)    Diverticulosis of colon    Bilateral hand pain    Prediabetes    Vertigo    Eczema    Vitamin D deficiency       Past Medical History:   Diagnosis Date    Bilateral foot pain 11/9/2015    Bronchitis     Chickenpox     Colon polyps 6/11    Tubular Adenoma    Flying phobia 11/4/2011    Gasping for breath     Goiter     Hand eczema 8/4/2014    HTN (hypertension) 11/4/2011    Hyperlipidemia LDL goal < 130 6/29/2012    Hypertension     Hypertriglyceridemia     Migraine     Pneumonia     Shortness of breath     Wears glasses         Past Surgical History:   Procedure Laterality Date     "MASS EXCISION ORTHO  2013    Performed by Gigi Chung M.D. at SURGERY [facility] ORS    TENDON REPAIR  2013    Performed by Gigi Chung M.D. at [facility] ORS    COLONOSCOPY      [place]  DELIVERY ONLY      PRIMARY C SECTION      TONSILLECTOMY      TONSILLECTOMY AND ADENOIDECTOMY          Current Outpatient Medications on File Prior to Visit   Medication Sig Dispense Refill    fluticasone (FLONASE) 50 MCG/ACT nasal spray Administer 2 Sprays into affected nostril(S) every day. 16 g 0    losartan (COZAAR) 25 MG Tab Take 1 Tablet by mouth every day. 90 Tablet 3    atorvastatin (LIPITOR) 40 MG Tab Take 1 Tablet by mouth every day. 90 Tablet 3    meclizine (ANTIVERT) 12.5 MG Tab Take 1 Tablet by mouth 3 times a day as needed for Dizziness. 30 Tablet 0    estradiol (ESTRACE) 0.1 MG/GM vaginal cream       Cholecalciferol (VITAMIN D3 PO) Take 5,000 Units by mouth every day.      Calcium Carbonate-Vitamin D (CALCIUM + D PO) Take  by mouth every day.       No current facility-administered medications on file prior to visit.        Health Maintenance: Completed  She had PAP in  with Dr Ly; get records    ROS:  Review of Systems   Constitutional: Negative.  Negative for fever and malaise/fatigue.   HENT: Negative.     Eyes: Negative.    Respiratory:  Negative for cough, sputum production and shortness of breath.    Cardiovascular:  Negative for chest pain, palpitations and leg swelling.   Gastrointestinal: Negative.    Genitourinary: Negative.    Musculoskeletal: Negative.    Neurological: Negative.    Endo/Heme/Allergies: Negative.    Psychiatric/Behavioral: Negative.       Objective       Exam:  /78 (BP Location: Left arm, Patient Position: Sitting, BP Cuff Size: Adult)   Pulse 82   Temp 36.3 °C (97.3 °F) (Temporal)   Resp 14   Ht 1.6 m (5' 3\")   Wt 75.3 kg (166 lb)   SpO2 98%   BMI 29.41 kg/m²  Body mass index is 29.41 kg/m².    Physical Exam  Constitutional:       " Appearance: Normal appearance.   Cardiovascular:      Rate and Rhythm: Normal rate and regular rhythm.      Pulses: Normal pulses.      Heart sounds: Normal heart sounds.   Pulmonary:      Effort: Pulmonary effort is normal.      Breath sounds: Normal breath sounds.   Musculoskeletal:      Right lower leg: No edema.      Left lower leg: No edema.   Skin:     General: Skin is warm and dry.   Neurological:      Mental Status: She is alert.       Assessment & Plan       63 y.o. female with the following -     Problem List Items Addressed This Visit       HTN (hypertension) (Chronic)     Chronic; goal BP < 130/80  BP in clinic today 120/78; no CP, palpitations, dizziness  - continue Losartan 25mg QD  - monitor annual CMP/fasting lipid; ordered today         Prediabetes (Chronic)     A1c today in clinic is 5.7 (down from 5.8); not currently on medication. Had GI intolerance to metformin  - she will continue with dietary changes, eliminate processed carbs/sugars; stay active; has lost over 20 lbs in the past year   - monitor A1c Q3-6 months         Relevant Orders    POCT  A1C (Completed)    Comp Metabolic Panel    Lipid Profile    CBC WITHOUT DIFFERENTIAL    TSH WITH REFLEX TO FT4    Eczema (Chronic)     Chronic, intermittent excema flare on hands, elbows, ankles sometimes. Worse when out in the sun; gets relief with triamcinolone cream; keep skin moisturized with emolient         Relevant Medications    triamcinolone acetonide (KENALOG) 0.1 % Ointment    Hyperlipidemia with target LDL less than 130      Latest Reference Range & Units 08/25/22 09:52   Cholesterol,Tot 100 - 199 mg/dL 148   Triglycerides 0 - 149 mg/dL 181 (H)   HDL >=40 mg/dL 41   LDL <100 mg/dL 71   The 10-year ASCVD risk score (Emma TINAJERO, et al., 2019) is: 5.1%     Chronic; goal LDL < 100; tolerating statin, no myalgia  - continue Atorvastatin 40mg QD  - counseled on heart healthy diet, stay active  - monitor annual fasting lipid;          Vertigo      Chronic, intermittent vertigo for several years. Takes prn meclizine 12.5mg  - going on cruise & would like scopolamine patch          Relevant Medications    scopolamine (TRANSDERM SCOP, 1.5 MG,) 1 mg/72hr PATCH 72 HR    Vitamin D deficiency     Taking vit D 1000 U + calcium for bone health; no recent falls or fractures  - monitor annual vit D level          Relevant Orders    VITAMIN D,25 HYDROXY (DEFICIENCY)     Other Visit Diagnoses       Need for hepatitis C screening test        Relevant Orders    HCV Scrn ( [id number] 1xLife - Medicare Patients Only)    Screening for metabolic disorder        Relevant Orders    Comp Metabolic Panel    Lipid Profile    CBC WITHOUT DIFFERENTIAL    TSH WITH REFLEX TO FT4            Medications Prescribed Today:  5. Other eczema  - triamcinolone acetonide (KENALOG) 0.1 % Ointment; Apply 1 Application. topically 2 times a day.  Dispense: 30 g; Refill: 1    6. Vertigo  - scopolamine (TRANSDERM SCOP, 1.5 MG,) 1 mg/72hr PATCH 72 HR; Place 1 Patch on the skin every 72 hours.  Dispense: 4 Patch; Refill: 1    Educated in proper administration of medication(s) ordered today including safety, possible SE, risks, benefits, rationale and alternatives to therapy.     Return in about 3 months (around 2023) for Annual Visit.    Please note that this dictation was created using voice recognition software. I have made every reasonable attempt to correct obvious errors, but I expect that there are errors of grammar and possibly content that I did not discover before finalizing the note.

## 2023-05-07 PROBLEM — L30.9 ECZEMA: Chronic | Status: ACTIVE | Noted: 2023-05-07

## 2023-05-07 PROBLEM — L30.9 ECZEMA: Status: ACTIVE | Noted: 2023-05-07

## 2023-05-07 PROBLEM — R73.03 PREDIABETES: Chronic | Status: ACTIVE | Noted: 2022-08-27

## 2023-05-07 PROBLEM — E55.9 VITAMIN D DEFICIENCY: Status: ACTIVE | Noted: 2023-05-07

## 2023-05-07 ASSESSMENT — ENCOUNTER SYMPTOMS
SHORTNESS OF BREATH: 0
EYES NEGATIVE: 1
PSYCHIATRIC NEGATIVE: 1
CONSTITUTIONAL NEGATIVE: 1
PALPITATIONS: 0
NEUROLOGICAL NEGATIVE: 1
GASTROINTESTINAL NEGATIVE: 1
SPUTUM PRODUCTION: 0
COUGH: 0
MUSCULOSKELETAL NEGATIVE: 1
FEVER: 0

## 2023-05-07 NOTE — ASSESSMENT & PLAN NOTE
Chronic, intermittent excema flare on hands, elbows, ankles sometimes. Worse when out in the sun; gets relief with triamcinolone cream; keep skin moisturized with emolient

## 2023-05-07 NOTE — ASSESSMENT & PLAN NOTE
Taking vit D 1000 U + calcium for bone health; no recent falls or fractures  - monitor annual vit D level

## 2023-05-07 NOTE — ASSESSMENT & PLAN NOTE
Chronic, intermittent vertigo for several years. Takes prn meclizine 12.5mg  - going on cruise & would like scopolamine patch

## 2023-05-07 NOTE — ASSESSMENT & PLAN NOTE
Chronic; goal BP < 130/80  BP in clinic today 120/78; no CP, palpitations, dizziness  - continue Losartan 25mg QD  - monitor annual CMP/fasting lipid; ordered today

## 2023-05-07 NOTE — ASSESSMENT & PLAN NOTE
A1c today in clinic is 5.7 (down from 5.8); not currently on medication. Had GI intolerance to metformin  - she will continue with dietary changes, eliminate processed carbs/sugars; stay active; has lost over 20 lbs in the past year   - monitor A1c Q3-6 months

## 2023-05-07 NOTE — ASSESSMENT & PLAN NOTE
Latest Reference Range & Units 08/25/22 09:52   Cholesterol,Tot 100 - 199 mg/dL 148   Triglycerides 0 - 149 mg/dL 181 (H)   HDL >=40 mg/dL 41   LDL <100 mg/dL 71   The 10-year ASCVD risk score (Emma TINAJERO, et al., 2019) is: 5.1%     Chronic; goal LDL < 100; tolerating statin, no myalgia  - continue Atorvastatin 40mg QD  - counseled on heart healthy diet, stay active  - monitor annual fasting lipid;

## 2023-06-22 NOTE — PROCEDURE: COUNSELING
Fax received requesting refill request for Zofran  Last filled 4/3/2023  lov  5/9/2023  Lab  5/17/2023    Medication and pharmacy loaded pending MD approval.      
Detail Level: Generalized
Detail Level: Zone

## 2023-07-18 ENCOUNTER — HOSPITAL ENCOUNTER (OUTPATIENT)
Dept: LAB | Facility: MEDICAL CENTER | Age: 63
End: 2023-07-18
Attending: NURSE PRACTITIONER
Payer: COMMERCIAL

## 2023-07-18 DIAGNOSIS — Z13.228 SCREENING FOR METABOLIC DISORDER: ICD-10-CM

## 2023-07-18 DIAGNOSIS — Z11.59 NEED FOR HEPATITIS C SCREENING TEST: ICD-10-CM

## 2023-07-18 DIAGNOSIS — R73.03 PREDIABETES: ICD-10-CM

## 2023-07-18 DIAGNOSIS — E55.9 VITAMIN D DEFICIENCY: ICD-10-CM

## 2023-07-18 LAB
ERYTHROCYTE [DISTWIDTH] IN BLOOD BY AUTOMATED COUNT: 42.7 FL (ref 35.9–50)
HCT VFR BLD AUTO: 44.6 % (ref 37–47)
HGB BLD-MCNC: 13.9 G/DL (ref 12–16)
MCH RBC QN AUTO: 26.6 PG (ref 27–33)
MCHC RBC AUTO-ENTMCNC: 31.2 G/DL (ref 32.2–35.5)
MCV RBC AUTO: 85.4 FL (ref 81.4–97.8)
PLATELET # BLD AUTO: 301 K/UL (ref 164–446)
PMV BLD AUTO: 10.6 FL (ref 9–12.9)
RBC # BLD AUTO: 5.22 M/UL (ref 4.2–5.4)
WBC # BLD AUTO: 5.4 K/UL (ref 4.8–10.8)

## 2023-07-18 PROCEDURE — 80061 LIPID PANEL: CPT

## 2023-07-18 PROCEDURE — 86803 HEPATITIS C AB TEST: CPT

## 2023-07-18 PROCEDURE — 85027 COMPLETE CBC AUTOMATED: CPT

## 2023-07-18 PROCEDURE — 84443 ASSAY THYROID STIM HORMONE: CPT

## 2023-07-18 PROCEDURE — 36415 COLL VENOUS BLD VENIPUNCTURE: CPT

## 2023-07-18 PROCEDURE — 82306 VITAMIN D 25 HYDROXY: CPT

## 2023-07-18 PROCEDURE — 80053 COMPREHEN METABOLIC PANEL: CPT

## 2023-07-19 LAB
25(OH)D3 SERPL-MCNC: 69 NG/ML (ref 30–100)
ALBUMIN SERPL BCP-MCNC: 4.5 G/DL (ref 3.2–4.9)
ALBUMIN/GLOB SERPL: 1.5 G/DL
ALP SERPL-CCNC: 92 U/L (ref 30–99)
ALT SERPL-CCNC: 13 U/L (ref 2–50)
ANION GAP SERPL CALC-SCNC: 11 MMOL/L (ref 7–16)
AST SERPL-CCNC: 14 U/L (ref 12–45)
BILIRUB SERPL-MCNC: 0.3 MG/DL (ref 0.1–1.5)
BUN SERPL-MCNC: 22 MG/DL (ref 8–22)
CALCIUM ALBUM COR SERPL-MCNC: 9.3 MG/DL (ref 8.5–10.5)
CALCIUM SERPL-MCNC: 9.7 MG/DL (ref 8.5–10.5)
CHLORIDE SERPL-SCNC: 105 MMOL/L (ref 96–112)
CHOLEST SERPL-MCNC: 243 MG/DL (ref 100–199)
CO2 SERPL-SCNC: 25 MMOL/L (ref 20–33)
CREAT SERPL-MCNC: 0.89 MG/DL (ref 0.5–1.4)
FASTING STATUS PATIENT QL REPORTED: NORMAL
GFR SERPLBLD CREATININE-BSD FMLA CKD-EPI: 73 ML/MIN/1.73 M 2
GLOBULIN SER CALC-MCNC: 3 G/DL (ref 1.9–3.5)
GLUCOSE SERPL-MCNC: 92 MG/DL (ref 65–99)
HCV AB SER QL: NORMAL
HDLC SERPL-MCNC: 47 MG/DL
LDLC SERPL CALC-MCNC: 156 MG/DL
POTASSIUM SERPL-SCNC: 4.4 MMOL/L (ref 3.6–5.5)
PROT SERPL-MCNC: 7.5 G/DL (ref 6–8.2)
SODIUM SERPL-SCNC: 141 MMOL/L (ref 135–145)
TRIGL SERPL-MCNC: 202 MG/DL (ref 0–149)
TSH SERPL DL<=0.005 MIU/L-ACNC: 3.06 UIU/ML (ref 0.38–5.33)

## 2023-07-28 RX ORDER — TETANUS TOXOID, REDUCED DIPHTHERIA TOXOID AND ACELLULAR PERTUSSIS VACCINE, ADSORBED 5; 2.5; 8; 8; 2.5 [IU]/.5ML; [IU]/.5ML; UG/.5ML; UG/.5ML; UG/.5ML
SUSPENSION INTRAMUSCULAR
COMMUNITY
End: 2023-08-01

## 2023-07-29 SDOH — ECONOMIC STABILITY: FOOD INSECURITY: WITHIN THE PAST 12 MONTHS, THE FOOD YOU BOUGHT JUST DIDN'T LAST AND YOU DIDN'T HAVE MONEY TO GET MORE.: PATIENT DECLINED

## 2023-07-29 SDOH — ECONOMIC STABILITY: TRANSPORTATION INSECURITY
IN THE PAST 12 MONTHS, HAS THE LACK OF TRANSPORTATION KEPT YOU FROM MEDICAL APPOINTMENTS OR FROM GETTING MEDICATIONS?: NO

## 2023-07-29 SDOH — HEALTH STABILITY: PHYSICAL HEALTH: ON AVERAGE, HOW MANY DAYS PER WEEK DO YOU ENGAGE IN MODERATE TO STRENUOUS EXERCISE (LIKE A BRISK WALK)?: 3 DAYS

## 2023-07-29 SDOH — ECONOMIC STABILITY: INCOME INSECURITY: IN THE LAST 12 MONTHS, WAS THERE A TIME WHEN YOU WERE NOT ABLE TO PAY THE MORTGAGE OR RENT ON TIME?: PATIENT REFUSED

## 2023-07-29 SDOH — ECONOMIC STABILITY: HOUSING INSECURITY
IN THE LAST 12 MONTHS, WAS THERE A TIME WHEN YOU DID NOT HAVE A STEADY PLACE TO SLEEP OR SLEPT IN A SHELTER (INCLUDING NOW)?: PATIENT REFUSED

## 2023-07-29 SDOH — ECONOMIC STABILITY: FOOD INSECURITY: WITHIN THE PAST 12 MONTHS, YOU WORRIED THAT YOUR FOOD WOULD RUN OUT BEFORE YOU GOT MONEY TO BUY MORE.: NEVER TRUE

## 2023-07-29 SDOH — HEALTH STABILITY: PHYSICAL HEALTH: ON AVERAGE, HOW MANY MINUTES DO YOU ENGAGE IN EXERCISE AT THIS LEVEL?: 30 MIN

## 2023-07-29 SDOH — ECONOMIC STABILITY: TRANSPORTATION INSECURITY
IN THE PAST 12 MONTHS, HAS LACK OF RELIABLE TRANSPORTATION KEPT YOU FROM MEDICAL APPOINTMENTS, MEETINGS, WORK OR FROM GETTING THINGS NEEDED FOR DAILY LIVING?: PATIENT DECLINED

## 2023-07-29 SDOH — HEALTH STABILITY: MENTAL HEALTH
STRESS IS WHEN SOMEONE FEELS TENSE, NERVOUS, ANXIOUS, OR CAN'T SLEEP AT NIGHT BECAUSE THEIR MIND IS TROUBLED. HOW STRESSED ARE YOU?: TO SOME EXTENT

## 2023-07-29 SDOH — ECONOMIC STABILITY: HOUSING INSECURITY

## 2023-07-29 SDOH — ECONOMIC STABILITY: TRANSPORTATION INSECURITY
IN THE PAST 12 MONTHS, HAS LACK OF TRANSPORTATION KEPT YOU FROM MEETINGS, WORK, OR FROM GETTING THINGS NEEDED FOR DAILY LIVING?: PATIENT DECLINED

## 2023-07-29 ASSESSMENT — SOCIAL DETERMINANTS OF HEALTH (SDOH)
HOW OFTEN DO YOU ATTENT MEETINGS OF THE CLUB OR ORGANIZATION YOU BELONG TO?: PATIENT DECLINED
WITHIN THE PAST 12 MONTHS, YOU WORRIED THAT YOUR FOOD WOULD RUN OUT BEFORE YOU GOT THE MONEY TO BUY MORE: NEVER TRUE
HOW OFTEN DO YOU ATTEND CHURCH OR RELIGIOUS SERVICES?: PATIENT DECLINED
HOW OFTEN DO YOU ATTENT MEETINGS OF THE CLUB OR ORGANIZATION YOU BELONG TO?: PATIENT DECLINED
IN A TYPICAL WEEK, HOW MANY TIMES DO YOU TALK ON THE PHONE WITH FAMILY, FRIENDS, OR NEIGHBORS?: ONCE A WEEK
IN A TYPICAL WEEK, HOW MANY TIMES DO YOU TALK ON THE PHONE WITH FAMILY, FRIENDS, OR NEIGHBORS?: ONCE A WEEK
HOW MANY DRINKS CONTAINING ALCOHOL DO YOU HAVE ON A TYPICAL DAY WHEN YOU ARE DRINKING: PATIENT DOES NOT DRINK
HOW OFTEN DO YOU GET TOGETHER WITH FRIENDS OR RELATIVES?: ONCE A WEEK
DO YOU BELONG TO ANY CLUBS OR ORGANIZATIONS SUCH AS CHURCH GROUPS UNIONS, FRATERNAL OR ATHLETIC GROUPS, OR SCHOOL GROUPS?: NO
HOW OFTEN DO YOU GET TOGETHER WITH FRIENDS OR RELATIVES?: ONCE A WEEK
HOW OFTEN DO YOU ATTEND CHURCH OR RELIGIOUS SERVICES?: PATIENT DECLINED
DO YOU BELONG TO ANY CLUBS OR ORGANIZATIONS SUCH AS CHURCH GROUPS UNIONS, FRATERNAL OR ATHLETIC GROUPS, OR SCHOOL GROUPS?: NO

## 2023-07-29 ASSESSMENT — LIFESTYLE VARIABLES: HOW MANY STANDARD DRINKS CONTAINING ALCOHOL DO YOU HAVE ON A TYPICAL DAY: PATIENT DOES NOT DRINK

## 2023-08-01 ENCOUNTER — OFFICE VISIT (OUTPATIENT)
Dept: MEDICAL GROUP | Facility: PHYSICIAN GROUP | Age: 63
End: 2023-08-01
Payer: COMMERCIAL

## 2023-08-01 VITALS
SYSTOLIC BLOOD PRESSURE: 118 MMHG | WEIGHT: 162 LBS | DIASTOLIC BLOOD PRESSURE: 74 MMHG | OXYGEN SATURATION: 96 % | HEIGHT: 63 IN | BODY MASS INDEX: 28.7 KG/M2 | TEMPERATURE: 98.1 F | RESPIRATION RATE: 16 BRPM | HEART RATE: 81 BPM

## 2023-08-01 DIAGNOSIS — Z00.00 ENCOUNTER FOR PREVENTATIVE ADULT HEALTH CARE EXAMINATION: ICD-10-CM

## 2023-08-01 DIAGNOSIS — E78.5 HYPERLIPIDEMIA WITH TARGET LDL LESS THAN 130: ICD-10-CM

## 2023-08-01 DIAGNOSIS — R73.03 PREDIABETES: Chronic | ICD-10-CM

## 2023-08-01 DIAGNOSIS — I10 PRIMARY HYPERTENSION: Chronic | ICD-10-CM

## 2023-08-01 DIAGNOSIS — E55.9 VITAMIN D DEFICIENCY: ICD-10-CM

## 2023-08-01 PROCEDURE — 3074F SYST BP LT 130 MM HG: CPT | Performed by: NURSE PRACTITIONER

## 2023-08-01 PROCEDURE — 3078F DIAST BP <80 MM HG: CPT | Performed by: NURSE PRACTITIONER

## 2023-08-01 PROCEDURE — 99396 PREV VISIT EST AGE 40-64: CPT | Performed by: NURSE PRACTITIONER

## 2023-08-01 ASSESSMENT — FIBROSIS 4 INDEX: FIB4 SCORE: 0.81

## 2023-08-01 NOTE — PROGRESS NOTES
Subjective:     CC:   Chief Complaint   Patient presents with    Annual Exam       HPI:   63 y.o. female here today for annual exam. She is feeling well and denies any complaints.  Has been off atorvatstatin since 2023; she was recently on cruise.     Ob-Gyn/ History:    Patient has GYN provider: yes; Dr Ly  /Para:  3/3  Last Pap Smear:  ; has appointment next month  Gyn Surgery:  may be getting surgical repair of prolapse (bladder/uterus)  Last menstrual period:  menopause age early 50s  Post-menopausal bleeding: denies  Urinary incontinence: increased frequency with prolapse  Folate intake: none    Health Maintenance  Advanced directive: on file  Osteoporosis Screen/ DEXA: n/a; can start first dexa scan age 65  PT/vit D for falls prevention: no recent falls/fractures; taking daily vit D + calcium  Cholesterol Screening: recent hold with atorvastatin; TC//156  Diabetes Screening: a1c 5.7  Aspirin Use: none   Diet: heart healthy; adequate fiber  Exercise: recommend aerobic/resistance training  Substance Abuse: denies  Safe in relationship.  Seat belts discussed.  Sun protection used.    Cancer screening  Colorectal Cancer Screening: colonoscopy 2021; multiple diverticuli; recall in 5 years  Lung Cancer Screening: n/a; non smoker  Cervical Cancer Screenin; has appointment next month with GYN  Breast Cancer Screenin2022 normal mammogram; recall in 2 years    Infectious disease screening/Immunizations  --STI Screening: declined  --Practices safe sex.  --HIV Screening: declined  --Hepatitis C Screening: -ve 2023  --Immunizations: up to date  Immunization History   Administered Date(s) Administered    INFLUENZA TIV (IM) 10/21/2014    Influenza (IM) Preservative Free - HISTORICAL DATA 2011    Influenza Seasonal Injectable - Historical Data 2012, 10/21/2014    Influenza Vaccine Quad Inj (Pf) 2018, 2019    Influenza Vaccine Quad Inj (Preserved) 2013     PFIZER PURPLE CAP SARS-COV-2 VACCINATION (12+) 2021, 2021, 2022    Tdap Vaccine 2007, 2019    Zoster Vaccine Live (ZVL) (Zostavax) - HISTORICAL DATA 07/15/2015    Zoster Vaccine Recombinant (RZV) (SHINGRIX) 2021, 10/05/2021     She  has a past medical history of Bilateral foot pain (2015), Bronchitis, Chickenpox, Colon polyps (), Flying phobia (2011), Gasping for breath, Goiter, Hand eczema (2014), HTN (hypertension) (2011), Hyperlipidemia LDL goal < 130 (2012), Hypertension, Hypertriglyceridemia, Migraine, Pneumonia, Shortness of breath, and Wears glasses.  She  has a past surgical history that includes tonsillectomy and adenoidectomy; colonoscopy (); pr  delivery only (); mass excision ortho (2013); tendon repair (2013); primary c section; and tonsillectomy.    Family History   Problem Relation Age of Onset    Arthritis Mother         Osteoporosis    Lung Disease Mother         COPD, smoker    Heart Disease Mother         Arrythmia    Hypertension Mother     Hyperlipidemia Mother     Heart Disease Maternal Grandfather         MI at 54    Stroke Brother     Hyperlipidemia Brother     Arthritis Brother         fibromyalgia    Heart Disease Father     Lung Disease Father         COPD lung cancer     Diabetes Maternal Aunt     Stroke Maternal Aunt     Diabetes Paternal Grandmother     Stroke Maternal Uncle     Hypertension Other        Social History     Socioeconomic History    Marital status:      Spouse name: Not on file    Number of children: Not on file    Years of education: Not on file    Highest education level: 12th grade   Occupational History    Occupation: Teacher     Employer: [employer]   Tobacco Use    Smoking status: Never    Smokeless tobacco: Never   Vaping Use    Vaping Use: Never used   Substance and Sexual Activity    Alcohol use: Yes     Comment: once per year    Drug use: No    Sexual activity:  Yes     Partners: Male     Birth control/protection: Post-Menopausal   Other Topics Concern    Not on file   Social History Narrative    Patient is  and works full-time as an  at [employer]     Social Determinants of Health     Financial Resource Strain: Not on file   Food Insecurity: Unknown (7/29/2023)    Hunger Vital Sign     Worried About Running Out of Food in the Last Year: Never true     Ran Out of Food in the Last Year: Patient refused   Transportation Needs: Unknown (7/29/2023)    PRAPARE - Transportation     Lack of Transportation (Medical): No     Lack of Transportation (Non-Medical): Patient refused   Physical Activity: Insufficiently Active (7/29/2023)    Exercise Vital Sign     Days of Exercise per Week: 3 days     Minutes of Exercise per Session: 30 min   Stress: Stress Concern Present (7/29/2023)    [employer] [place] [employer] - Occupational Stress Questionnaire     Feeling of Stress : To some extent   Social Connections: Unknown (7/29/2023)    Social Connection and Isolation Panel [NHANES]     Frequency of Communication with Friends and Family: Once a week     Frequency of Social Gatherings with Friends and Family: Once a week     Attends Sikhism Services: Patient refused     Active Member of Clubs or Organizations: No     Attends Club or Organization Meetings: Patient refused     Marital Status:    Intimate Partner Violence: Not on file   Housing Stability: Unknown (7/29/2023)    Housing Stability Vital Sign     Unable to Pay for Housing in the Last Year: Patient refused     Number of Places Lived in the Last Year: Not on file     Unstable Housing in the Last Year: Patient refused       Patient Active Problem List    Diagnosis Date Noted    Eczema 05/07/2023    Vitamin D deficiency 05/07/2023    Prediabetes 08/27/2022    Vertigo 08/27/2022    Bilateral hand pain 11/09/2021    Diverticulosis of colon 07/31/2021    History of 2019  novel coronavirus disease (COVID-19) 03/23/2021    Sun-damaged skin 04/30/2018    Female bladder prolapse 06/16/2016    DDD (degenerative disc disease), lumbar 07/24/2015    DDD (degenerative disc disease), cervical 02/27/2015    Hyperlipidemia with target LDL less than 130 06/29/2012    Colon polyps 04/06/2012    HTN (hypertension) 11/04/2011    Goiter     Migraine with aura and without status migrainosus, not intractable      Current Outpatient Medications   Medication Sig Dispense Refill    triamcinolone acetonide (KENALOG) 0.1 % Ointment Apply 1 Application. topically 2 times a day. 30 g 1    scopolamine (TRANSDERM SCOP, 1.5 MG,) 1 mg/72hr PATCH 72 HR Place 1 Patch on the skin every 72 hours. 4 Patch 1    fluticasone (FLONASE) 50 MCG/ACT nasal spray Administer 2 Sprays into affected nostril(S) every day. 16 g 0    losartan (COZAAR) 25 MG Tab Take 1 Tablet by mouth every day. 90 Tablet 3    atorvastatin (LIPITOR) 40 MG Tab Take 1 Tablet by mouth every day. 90 Tablet 3    meclizine (ANTIVERT) 12.5 MG Tab Take 1 Tablet by mouth 3 times a day as needed for Dizziness. 30 Tablet 0    estradiol (ESTRACE) 0.1 MG/GM vaginal cream       Cholecalciferol (VITAMIN D3 PO) Take 5,000 Units by mouth every day.      Calcium Carbonate-Vitamin D (CALCIUM + D PO) Take  by mouth every day.       No current facility-administered medications for this visit.     No Known Allergies    Review of Systems   Constitutional: Negative for fever, chills and malaise/fatigue.   HENT: Negative for congestion.    Eyes: Negative for pain.    Respiratory: Negative for cough and shortness of breath.  Cardiovascular: Negative for leg swelling.   Gastrointestinal: Negative for nausea, vomiting, abdominal pain and diarrhea.   Genitourinary: Negative for dysuria and hematuria.   Skin: Negative for rash.   Neurological: Negative for dizziness, focal weakness and headaches.   Endo/Heme/Allergies: Does not bleed easily.   Psychiatric/Behavioral: Negative  "for depression.  The patient is not nervous/anxious.      Objective:     /74   Pulse 81   Temp 36.7 °C (98.1 °F) (Temporal)   Resp 16   Ht 1.6 m (5' 3\")   Wt 73.5 kg (162 lb)   SpO2 96%   BMI 28.70 kg/m²   Body mass index is 28.7 kg/m².  Wt Readings from Last 4 Encounters:   08/01/23 73.5 kg (162 lb)   05/05/23 75.3 kg (166 lb)   10/19/22 84.4 kg (186 lb)   08/25/22 83 kg (183 lb)       Physical Exam:  Constitutional: Well-developed and well-nourished. Not diaphoretic. No distress.   Skin: Skin is warm and dry. No rash noted.  Head: Atraumatic without lesions.  Eyes: Conjunctivae and extraocular motions are normal. Pupils are equal, round, and reactive to light. No scleral icterus.   Mouth/Throat: Dentition is okay, no visible cavities. Tongue normal. Oropharynx is clear and moist. Posterior pharynx without erythema or exudates.  Neck: Supple, trachea midline. Normal range of motion. No thyromegaly present. No lymphadenopathy--cervical or supraclavicular.  Cardiovascular: Regular rate and rhythm, S1 and S2 without murmur, rubs, or gallops.  Lungs: Normal inspiratory effort, CTA bilaterally, no wheezes/rhonchi/rales  Breast: SBE per patient, no new changes. Clinical breast exam with GYN next month.    Abdomen: Soft, non tender, and without distention. Active bowel sounds in all four quadrants. No rebound, guarding, masses or HSM.  : deferred to GYN visit  Extremities: No cyanosis, clubbing, erythema, nor edema. Distal pulses intact and symmetric.   Musculoskeletal: All major joints AROM full in all directions without pain.  Neurological: Alert and oriented x 3. DTRs 2+/3 and symmetric. No cranial nerve deficit. 5/5 myotomes. Sensation intact.   Psychiatric:  Behavior, mood, and affect are appropriate.    Assessment and Plan:   1. Prediabetes  A1c at 5.7 in 5/2023 (down from 5.8); not currently on medication. Had GI intolerance to metformin  - she will continue with dietary changes, eliminate processed " carbs/sugars; stay active; has lost over 20 lbs in the past year   - check  A1c in 3 months    - HEMOGLOBIN A1C; Future    2. Hyperlipidemia with target LDL less than 130  Was taking Atorvastatin 40mg QD; on hold since 5/2023, trying dietary changes with exercise   Latest Reference Range & Units 08/25/22 09:52 07/18/23 10:16   Cholesterol,Tot 100 - 199 mg/dL 148 243 (H)   Triglycerides 0 - 149 mg/dL 181 (H) 202 (H)   HDL >=40 mg/dL 41 47   LDL <100 mg/dL 71 156 (H)   The 10-year ASCVD risk score (Emma TINAJERO, et al., 2019) is: 6.3%    Chronic; goal LDL < 100; was on Atorvastatin 40mg, tolerating well. We put statin on hold in 5/2023 and she is working on diet/exercise  - recheck lipid in 3 months, if no improvement with lifestyle changes alone we plan to restart Atorvastatin at 20mg.  - counseled on heart healthy diet, stay active    - Lipid Profile; Future    3. Primary hypertension  Chronic; goal BP < 130/80  BP in clinic today 118/74; no CP, palpitations, dizziness  - continue Losartan 25mg QD  - monitor annual CMP/fasting lipid;     4. Vitamin D deficiency  Taking vit D 1000 U + calcium for bone health; no recent falls or fractures  - monitor annual vit D level    5. Encounter for preventative adult health care examination  Health Care Maintenance:     Labs per orders  Immunizations per orders; none due today  Patient counseled about skin care, diet, supplements, prenatal vitamins, safe sex and exercise.    Follow-up: Return in about 8 months (around 4/1/2024) for prediabetes, hyperlipidemia.

## 2023-08-01 NOTE — ASSESSMENT & PLAN NOTE
A1c at 5.7 in 5/2023 (down from 5.8); not currently on medication. Had GI intolerance to metformin  - she will continue with dietary changes, eliminate processed carbs/sugars; stay active; has lost over 20 lbs in the past year   - check  A1c in 3 months

## 2023-08-01 NOTE — ASSESSMENT & PLAN NOTE
Chronic; goal BP < 130/80  BP in clinic today 118/74; no CP, palpitations, dizziness  - continue Losartan 25mg QD  - monitor annual CMP/fasting lipid;

## 2023-08-01 NOTE — ASSESSMENT & PLAN NOTE
Latest Reference Range & Units 08/25/22 09:52 07/18/23 10:16   Cholesterol,Tot 100 - 199 mg/dL 148 243 (H)   Triglycerides 0 - 149 mg/dL 181 (H) 202 (H)   HDL >=40 mg/dL 41 47   LDL <100 mg/dL 71 156 (H)   The 10-year ASCVD risk score (Emma TINAJERO, et al., 2019) is: 6.3%    Chronic; goal LDL < 100; was on Atorvastatin 40mg, tolerating well. We put statin on hold in 5/2023 and she is working on diet/exercise  - recheck lipid in 3 months, if no improvement with lifestyle changes alone we plan to restart Atorvastatin at 20mg.  - counseled on heart healthy diet, stay active

## 2023-09-05 DIAGNOSIS — I10 PRIMARY HYPERTENSION: ICD-10-CM

## 2023-09-06 RX ORDER — LOSARTAN POTASSIUM 25 MG/1
25 TABLET ORAL DAILY
Qty: 90 TABLET | Refills: 3 | Status: SHIPPED | OUTPATIENT
Start: 2023-09-06

## 2023-12-15 ENCOUNTER — HOSPITAL ENCOUNTER (OUTPATIENT)
Dept: LAB | Facility: MEDICAL CENTER | Age: 63
End: 2023-12-15
Attending: NURSE PRACTITIONER
Payer: COMMERCIAL

## 2023-12-15 DIAGNOSIS — E78.5 HYPERLIPIDEMIA WITH TARGET LDL LESS THAN 130: ICD-10-CM

## 2023-12-15 DIAGNOSIS — R73.03 PREDIABETES: Chronic | ICD-10-CM

## 2023-12-15 LAB
CHOLEST SERPL-MCNC: 278 MG/DL (ref 100–199)
EST. AVERAGE GLUCOSE BLD GHB EST-MCNC: 123 MG/DL
FASTING STATUS PATIENT QL REPORTED: NORMAL
HBA1C MFR BLD: 5.9 % (ref 4–5.6)
HDLC SERPL-MCNC: 49 MG/DL
LDLC SERPL CALC-MCNC: 179 MG/DL
TRIGL SERPL-MCNC: 249 MG/DL (ref 0–149)

## 2023-12-15 PROCEDURE — 36415 COLL VENOUS BLD VENIPUNCTURE: CPT

## 2023-12-15 PROCEDURE — 80061 LIPID PANEL: CPT

## 2023-12-15 PROCEDURE — 83036 HEMOGLOBIN GLYCOSYLATED A1C: CPT

## 2023-12-17 DIAGNOSIS — E78.5 HYPERLIPIDEMIA WITH TARGET LDL LESS THAN 130: ICD-10-CM

## 2023-12-17 RX ORDER — ATORVASTATIN CALCIUM 20 MG/1
20 TABLET, FILM COATED ORAL NIGHTLY
Qty: 90 TABLET | Refills: 1 | Status: SHIPPED | OUTPATIENT
Start: 2023-12-17

## 2023-12-18 NOTE — PROGRESS NOTES
1. Hyperlipidemia with target LDL less than 130   Latest Reference Range & Units 07/18/23 10:16 12/15/23 10:37   Cholesterol,Tot 100 - 199 mg/dL 243 (H) 278 (H)   Triglycerides 0 - 149 mg/dL 202 (H) 249 (H)   HDL >=40 mg/dL 47 49   LDL <100 mg/dL 156 (H) 179 (H)     - restarting Atorvastatin at 20mg; did trial off 40mg in 5/2023        - atorvastatin (LIPITOR) 20 MG Tab; Take 1 Tablet by mouth every evening.  Dispense: 90 Tablet; Refill: 1  - Lipid Profile; Future

## 2024-01-16 ENCOUNTER — APPOINTMENT (RX ONLY)
Dept: URBAN - METROPOLITAN AREA CLINIC 22 | Facility: CLINIC | Age: 64
Setting detail: DERMATOLOGY
End: 2024-01-16

## 2024-01-16 DIAGNOSIS — L82.1 OTHER SEBORRHEIC KERATOSIS: ICD-10-CM

## 2024-01-16 DIAGNOSIS — D22 MELANOCYTIC NEVI: ICD-10-CM

## 2024-01-16 DIAGNOSIS — Z71.89 OTHER SPECIFIED COUNSELING: ICD-10-CM

## 2024-01-16 DIAGNOSIS — L81.4 OTHER MELANIN HYPERPIGMENTATION: ICD-10-CM

## 2024-01-16 DIAGNOSIS — L82.0 INFLAMED SEBORRHEIC KERATOSIS: ICD-10-CM

## 2024-01-16 PROBLEM — D22.5 MELANOCYTIC NEVI OF TRUNK: Status: ACTIVE | Noted: 2024-01-16

## 2024-01-16 PROCEDURE — ? LIQUID NITROGEN

## 2024-01-16 PROCEDURE — 17110 DESTRUCTION B9 LES UP TO 14: CPT

## 2024-01-16 PROCEDURE — ? COUNSELING

## 2024-01-16 PROCEDURE — 99213 OFFICE O/P EST LOW 20 MIN: CPT | Mod: 25

## 2024-01-16 PROCEDURE — ? SUNSCREEN RECOMMENDATIONS

## 2024-01-16 ASSESSMENT — LOCATION ZONE DERM
LOCATION ZONE: TRUNK
LOCATION ZONE: LEG
LOCATION ZONE: NECK
LOCATION ZONE: FACE
LOCATION ZONE: ARM

## 2024-01-16 ASSESSMENT — LOCATION DETAILED DESCRIPTION DERM
LOCATION DETAILED: SUPERIOR THORACIC SPINE
LOCATION DETAILED: RIGHT DISTAL PRETIBIAL REGION
LOCATION DETAILED: LEFT VENTRAL PROXIMAL FOREARM
LOCATION DETAILED: RIGHT SUPERIOR LATERAL LOWER BACK
LOCATION DETAILED: LEFT LATERAL DISTAL PRETIBIAL REGION
LOCATION DETAILED: SUPERIOR LUMBAR SPINE
LOCATION DETAILED: RIGHT VENTRAL PROXIMAL FOREARM
LOCATION DETAILED: INFERIOR THORACIC SPINE
LOCATION DETAILED: RIGHT INFERIOR LATERAL NECK
LOCATION DETAILED: LEFT BUTTOCK
LOCATION DETAILED: INFERIOR MID FOREHEAD
LOCATION DETAILED: LEFT SUPERIOR CENTRAL MALAR CHEEK

## 2024-01-16 ASSESSMENT — LOCATION SIMPLE DESCRIPTION DERM
LOCATION SIMPLE: RIGHT FOREARM
LOCATION SIMPLE: RIGHT LOWER BACK
LOCATION SIMPLE: RIGHT ANTERIOR NECK
LOCATION SIMPLE: LEFT CHEEK
LOCATION SIMPLE: LOWER BACK
LOCATION SIMPLE: INFERIOR FOREHEAD
LOCATION SIMPLE: UPPER BACK
LOCATION SIMPLE: LEFT PRETIBIAL REGION
LOCATION SIMPLE: RIGHT PRETIBIAL REGION
LOCATION SIMPLE: LEFT BUTTOCK
LOCATION SIMPLE: LEFT FOREARM

## 2024-01-16 NOTE — HPI: SKIN LESION
32
Is This A New Presentation, Or A Follow-Up?: Skin Lesion
How Severe Is Your Skin Lesion?: moderate
Has Your Skin Lesion Been Treated?: not been treated

## 2024-01-16 NOTE — PROCEDURE: SUNSCREEN RECOMMENDATIONS

## 2024-01-16 NOTE — PROCEDURE: LIQUID NITROGEN
Show Aperture Variable?: Yes
Include Z78.9 (Other Specified Conditions Influencing Health Status) As An Associated Diagnosis?: No
Number Of Freeze-Thaw Cycles: 2 freeze-thaw cycles
Medical Necessity Clause: This procedure was medically necessary because the lesions that were treated were:
Detail Level: Detailed
Spray Paint Text: The liquid nitrogen was applied to the skin utilizing a spray paint frosting technique.
Post-Care Instructions: I reviewed with the patient in detail post-care instructions. Patient is to wear sunprotection, and avoid picking at any of the treated lesions. Pt may apply Vaseline to crusted or scabbing areas.
Consent: The patient's consent was obtained including but not limited to risks of crusting, scabbing, blistering, scarring, darker or lighter pigmentary change, recurrence, incomplete removal and infection.
Medical Necessity Information: It is in your best interest to select a reason for this procedure from the list below. All of these items fulfill various CMS LCD requirements except the new and changing color options.

## 2024-04-05 ENCOUNTER — OFFICE VISIT (OUTPATIENT)
Dept: MEDICAL GROUP | Facility: PHYSICIAN GROUP | Age: 64
End: 2024-04-05
Payer: COMMERCIAL

## 2024-04-05 VITALS
OXYGEN SATURATION: 97 % | WEIGHT: 177 LBS | BODY MASS INDEX: 31.36 KG/M2 | DIASTOLIC BLOOD PRESSURE: 70 MMHG | SYSTOLIC BLOOD PRESSURE: 110 MMHG | HEIGHT: 63 IN | TEMPERATURE: 98.3 F | HEART RATE: 80 BPM

## 2024-04-05 DIAGNOSIS — E78.5 HYPERLIPIDEMIA WITH TARGET LDL LESS THAN 130: ICD-10-CM

## 2024-04-05 DIAGNOSIS — E66.9 OBESITY (BMI 30-39.9): ICD-10-CM

## 2024-04-05 DIAGNOSIS — K64.4 EXTERNAL HEMORRHOID: ICD-10-CM

## 2024-04-05 PROCEDURE — 3078F DIAST BP <80 MM HG: CPT | Performed by: NURSE PRACTITIONER

## 2024-04-05 PROCEDURE — 3074F SYST BP LT 130 MM HG: CPT | Performed by: NURSE PRACTITIONER

## 2024-04-05 PROCEDURE — 99214 OFFICE O/P EST MOD 30 MIN: CPT | Performed by: NURSE PRACTITIONER

## 2024-04-05 ASSESSMENT — ENCOUNTER SYMPTOMS
VOMITING: 0
NEUROLOGICAL NEGATIVE: 1
ABDOMINAL PAIN: 0
SHORTNESS OF BREATH: 0
MUSCULOSKELETAL NEGATIVE: 1
PALPITATIONS: 0
CONSTIPATION: 1
NAUSEA: 0
SPUTUM PRODUCTION: 0
EYES NEGATIVE: 1
FEVER: 0
BLOOD IN STOOL: 0
COUGH: 0
CONSTITUTIONAL NEGATIVE: 1

## 2024-04-05 ASSESSMENT — FIBROSIS 4 INDEX: FIB4 SCORE: 0.83

## 2024-04-05 ASSESSMENT — PATIENT HEALTH QUESTIONNAIRE - PHQ9: CLINICAL INTERPRETATION OF PHQ2 SCORE: 0

## 2024-04-05 NOTE — PROGRESS NOTES
Subjective       CC:   Chief Complaint   Patient presents with    Constipation     4-5 months         HPI:   Patient is a 64 y.o. established female patient with medical history listed below here today for evaluation and management of hyperlipidemia. We restarted Atorvastatin in 2023. Tolerating okay.     New concern today - constipated off/on since hysterectomy in 2023 (with Dr Ly)    Patient Active Problem List   Diagnosis    Goiter    Migraine with aura and without status migrainosus, not intractable    HTN (hypertension)    Colon polyps    Hyperlipidemia with target LDL less than 130    DDD (degenerative disc disease), cervical    DDD (degenerative disc disease), lumbar    Female bladder prolapse    Sun-damaged skin    History of 2019 novel coronavirus disease (COVID-19)    Diverticulosis of colon    Bilateral hand pain    Prediabetes    Vertigo    Eczema    Vitamin D deficiency    External hemorrhoid       Past Medical History:   Diagnosis Date    Bilateral foot pain 2015    Bronchitis     Chickenpox     Colon polyps     Tubular Adenoma    Flying phobia 2011    Gasping for breath     Goiter     Hand eczema 2014    HTN (hypertension) 2011    Hyperlipidemia LDL goal < 130 2012    Hypertension     Hypertriglyceridemia     Migraine     Pneumonia     Shortness of breath     Wears glasses         Past Surgical History:   Procedure Laterality Date    MASS EXCISION ORTHO  2013    Performed by Gigi Chung M.D. at SURGERY [facility] ORS    TENDON REPAIR  2013    Performed by Gigi Chung M.D. at [facility] ORS    COLONOSCOPY      [place]  DELIVERY ONLY  1994    PRIMARY C SECTION      TONSILLECTOMY      TONSILLECTOMY AND ADENOIDECTOMY          Current Outpatient Medications on File Prior to Visit   Medication Sig Dispense Refill    atorvastatin (LIPITOR) 20 MG Tab Take 1 Tablet by mouth every evening. 90 Tablet 1    losartan (COZAAR) 25 MG Tab Take 1  "Tablet by mouth every day. 90 Tablet 3    triamcinolone acetonide (KENALOG) 0.1 % Ointment Apply 1 Application. topically 2 times a day. 30 g 1    fluticasone (FLONASE) 50 MCG/ACT nasal spray Administer 2 Sprays into affected nostril(S) every day. 16 g 0    meclizine (ANTIVERT) 12.5 MG Tab Take 1 Tablet by mouth 3 times a day as needed for Dizziness. 30 Tablet 0    estradiol (ESTRACE) 0.1 MG/GM vaginal cream       Cholecalciferol (VITAMIN D3 PO) Take 5,000 Units by mouth every day.      Calcium Carbonate-Vitamin D (CALCIUM + D PO) Take  by mouth every day.       No current facility-administered medications on file prior to visit.        Health Maintenance: Completed    ROS:  Review of Systems   Constitutional: Negative.  Negative for fever and malaise/fatigue.   HENT: Negative.     Eyes: Negative.    Respiratory:  Negative for cough, sputum production and shortness of breath.    Cardiovascular:  Negative for chest pain, palpitations and leg swelling.   Gastrointestinal:  Positive for constipation. Negative for abdominal pain, blood in stool, nausea and vomiting.   Genitourinary: Negative.    Musculoskeletal: Negative.    Neurological: Negative.    Endo/Heme/Allergies: Negative.        Objective       Exam:  /70 (BP Location: Right arm, Patient Position: Sitting, BP Cuff Size: Adult)   Pulse 80   Temp 36.8 °C (98.3 °F) (Temporal)   Ht 1.6 m (5' 3\")   Wt 80.3 kg (177 lb)   SpO2 97%   BMI 31.35 kg/m²  Body mass index is 31.35 kg/m².    Physical Exam  Exam conducted with a chaperone present (Nidia/MA).   Constitutional:       Appearance: Normal appearance.   Abdominal:      General: Bowel sounds are normal.      Palpations: Abdomen is soft.   Genitourinary:     Rectum: Tenderness and external hemorrhoid present.       Neurological:      Mental Status: She is alert.         Labs: reviewed with patient; questions answered    Assessment & Plan       64 y.o. female with the following -     Problem List Items " Addressed This Visit       Hyperlipidemia with target LDL less than 130      Latest Reference Range & Units 07/18/23 10:16 12/15/23 10:37   Cholesterol,Tot 100 - 199 mg/dL 243 (H) 278 (H)   Triglycerides 0 - 149 mg/dL 202 (H) 249 (H)   HDL >=40 mg/dL 47 49   LDL <100 mg/dL 156 (H) 179 (H)   The 10-year ASCVD risk score (Emma TINAJERO, et al., 2019) is: 6.4%    Chronic; goal LDL < 100; was on Atorvastatin 40mg in the past. We put statin on hold in 5/2023 to see if she can maintain cholesterol with diet alone. Unfortunately her LDL has gone up and we restarted Atorvastatin on 12/17/2023. Tolerating well. She is also working on diet/exercise  - continue Atorvastatin 40mg QHS  - recheck lipid in 3 months,   - counseled on heart healthy diet, stay active           External hemorrhoid     Reports getting constipated shortly after her hysterectomy procedure in 11/2023. She got relief with mag citrate and also uses dulcolax prn. She added psylium husk fiber to her diet recently and this has also helped. There is a small skin tag and slight hemorrhoid bulge noted around rectal area today on exam. No visible rectal bleed. She has not noticed any bloody stools. No abdominal pain or fever. Rectal area sometimes painful with stools  - continue with dietary fiber daily; prn laxative + stool softener  - prn preparation H cream or wipes for itching/pain            Educated in proper administration of medication(s) ordered today including safety, possible SE, risks, benefits, rationale and alternatives to therapy.       Return in about 6 months (around 10/5/2024) for establish care.    Please note that this dictation was created using voice recognition software. I have made every reasonable attempt to correct obvious errors, but I expect that there are errors of grammar and possibly content that I did not discover before finalizing the note.

## 2024-04-06 NOTE — ASSESSMENT & PLAN NOTE
Latest Reference Range & Units 07/18/23 10:16 12/15/23 10:37   Cholesterol,Tot 100 - 199 mg/dL 243 (H) 278 (H)   Triglycerides 0 - 149 mg/dL 202 (H) 249 (H)   HDL >=40 mg/dL 47 49   LDL <100 mg/dL 156 (H) 179 (H)   The 10-year ASCVD risk score (Emma TINAJERO, et al., 2019) is: 6.4%    Chronic; goal LDL < 100; was on Atorvastatin 40mg in the past. We put statin on hold in 5/2023 to see if she can maintain cholesterol with diet alone. Unfortunately her LDL has gone up and we restarted Atorvastatin on 12/17/2023. Tolerating well. She is also working on diet/exercise  - continue Atorvastatin 40mg QHS  - recheck lipid in 3 months,   - counseled on heart healthy diet, stay active

## 2024-04-06 NOTE — ASSESSMENT & PLAN NOTE
Reports getting constipated shortly after her hysterectomy procedure in 11/2023. She got relief with mag citrate and also uses dulcolax prn. She added psylium husk fiber to her diet recently and this has also helped. There is a small skin tag and slight hemorrhoid bulge noted around rectal area today on exam. No visible rectal bleed. She has not noticed any bloody stools. No abdominal pain or fever. Rectal area sometimes painful with stools  - continue with dietary fiber daily; prn laxative + stool softener  - prn preparation H cream or wipes for itching/pain

## 2024-05-04 ENCOUNTER — HOSPITAL ENCOUNTER (OUTPATIENT)
Dept: LAB | Facility: MEDICAL CENTER | Age: 64
End: 2024-05-04
Attending: NURSE PRACTITIONER
Payer: COMMERCIAL

## 2024-05-04 DIAGNOSIS — E78.5 HYPERLIPIDEMIA WITH TARGET LDL LESS THAN 130: ICD-10-CM

## 2024-05-04 LAB
CHOLEST SERPL-MCNC: 115 MG/DL (ref 100–199)
FASTING STATUS PATIENT QL REPORTED: NORMAL
HDLC SERPL-MCNC: 44 MG/DL
LDLC SERPL CALC-MCNC: 52 MG/DL
TRIGL SERPL-MCNC: 95 MG/DL (ref 0–149)

## 2024-05-24 ENCOUNTER — DOCUMENTATION (OUTPATIENT)
Dept: HEALTH INFORMATION MANAGEMENT | Facility: OTHER | Age: 64
End: 2024-05-24
Payer: COMMERCIAL

## 2024-06-12 ENCOUNTER — HOSPITAL ENCOUNTER (OUTPATIENT)
Dept: RADIOLOGY | Facility: MEDICAL CENTER | Age: 64
End: 2024-06-12
Attending: NURSE PRACTITIONER
Payer: COMMERCIAL

## 2024-06-12 DIAGNOSIS — Z12.31 VISIT FOR SCREENING MAMMOGRAM: ICD-10-CM

## 2024-06-12 PROCEDURE — 77063 BREAST TOMOSYNTHESIS BI: CPT

## 2024-06-24 ENCOUNTER — TELEPHONE (OUTPATIENT)
Dept: HEALTH INFORMATION MANAGEMENT | Facility: OTHER | Age: 64
End: 2024-06-24
Payer: COMMERCIAL

## 2024-08-20 SDOH — HEALTH STABILITY: PHYSICAL HEALTH: ON AVERAGE, HOW MANY MINUTES DO YOU ENGAGE IN EXERCISE AT THIS LEVEL?: 60 MIN

## 2024-08-20 SDOH — HEALTH STABILITY: PHYSICAL HEALTH: ON AVERAGE, HOW MANY DAYS PER WEEK DO YOU ENGAGE IN MODERATE TO STRENUOUS EXERCISE (LIKE A BRISK WALK)?: 3 DAYS

## 2024-08-20 SDOH — ECONOMIC STABILITY: INCOME INSECURITY: IN THE LAST 12 MONTHS, WAS THERE A TIME WHEN YOU WERE NOT ABLE TO PAY THE MORTGAGE OR RENT ON TIME?: NO

## 2024-08-20 SDOH — ECONOMIC STABILITY: HOUSING INSECURITY
IN THE LAST 12 MONTHS, WAS THERE A TIME WHEN YOU DID NOT HAVE A STEADY PLACE TO SLEEP OR SLEPT IN A SHELTER (INCLUDING NOW)?: NO

## 2024-08-20 SDOH — ECONOMIC STABILITY: FOOD INSECURITY: WITHIN THE PAST 12 MONTHS, THE FOOD YOU BOUGHT JUST DIDN'T LAST AND YOU DIDN'T HAVE MONEY TO GET MORE.: NEVER TRUE

## 2024-08-20 SDOH — ECONOMIC STABILITY: TRANSPORTATION INSECURITY
IN THE PAST 12 MONTHS, HAS LACK OF RELIABLE TRANSPORTATION KEPT YOU FROM MEDICAL APPOINTMENTS, MEETINGS, WORK OR FROM GETTING THINGS NEEDED FOR DAILY LIVING?: NO

## 2024-08-20 SDOH — ECONOMIC STABILITY: FOOD INSECURITY: WITHIN THE PAST 12 MONTHS, YOU WORRIED THAT YOUR FOOD WOULD RUN OUT BEFORE YOU GOT MONEY TO BUY MORE.: NEVER TRUE

## 2024-08-20 SDOH — HEALTH STABILITY: MENTAL HEALTH
STRESS IS WHEN SOMEONE FEELS TENSE, NERVOUS, ANXIOUS, OR CAN'T SLEEP AT NIGHT BECAUSE THEIR MIND IS TROUBLED. HOW STRESSED ARE YOU?: ONLY A LITTLE

## 2024-08-20 SDOH — ECONOMIC STABILITY: INCOME INSECURITY: HOW HARD IS IT FOR YOU TO PAY FOR THE VERY BASICS LIKE FOOD, HOUSING, MEDICAL CARE, AND HEATING?: NOT HARD AT ALL

## 2024-08-20 SDOH — ECONOMIC STABILITY: TRANSPORTATION INSECURITY
IN THE PAST 12 MONTHS, HAS LACK OF TRANSPORTATION KEPT YOU FROM MEETINGS, WORK, OR FROM GETTING THINGS NEEDED FOR DAILY LIVING?: NO

## 2024-08-20 ASSESSMENT — SOCIAL DETERMINANTS OF HEALTH (SDOH)
DO YOU BELONG TO ANY CLUBS OR ORGANIZATIONS SUCH AS CHURCH GROUPS UNIONS, FRATERNAL OR ATHLETIC GROUPS, OR SCHOOL GROUPS?: NO
IN A TYPICAL WEEK, HOW MANY TIMES DO YOU TALK ON THE PHONE WITH FAMILY, FRIENDS, OR NEIGHBORS?: TWICE A WEEK
HOW OFTEN DO YOU ATTEND CHURCH OR RELIGIOUS SERVICES?: NEVER
HOW HARD IS IT FOR YOU TO PAY FOR THE VERY BASICS LIKE FOOD, HOUSING, MEDICAL CARE, AND HEATING?: NOT HARD AT ALL
HOW OFTEN DO YOU ATTEND CHURCH OR RELIGIOUS SERVICES?: NEVER
HOW OFTEN DO YOU GET TOGETHER WITH FRIENDS OR RELATIVES?: ONCE A WEEK
IN A TYPICAL WEEK, HOW MANY TIMES DO YOU TALK ON THE PHONE WITH FAMILY, FRIENDS, OR NEIGHBORS?: TWICE A WEEK
DO YOU BELONG TO ANY CLUBS OR ORGANIZATIONS SUCH AS CHURCH GROUPS UNIONS, FRATERNAL OR ATHLETIC GROUPS, OR SCHOOL GROUPS?: NO
IN THE PAST 12 MONTHS, HAS THE ELECTRIC, GAS, OIL, OR WATER COMPANY THREATENED TO SHUT OFF SERVICE IN YOUR HOME?: NO
HOW OFTEN DO YOU GET TOGETHER WITH FRIENDS OR RELATIVES?: ONCE A WEEK
HOW OFTEN DO YOU ATTENT MEETINGS OF THE CLUB OR ORGANIZATION YOU BELONG TO?: NEVER
HOW OFTEN DO YOU ATTENT MEETINGS OF THE CLUB OR ORGANIZATION YOU BELONG TO?: NEVER
HOW OFTEN DO YOU HAVE SIX OR MORE DRINKS ON ONE OCCASION: NEVER
WITHIN THE PAST 12 MONTHS, YOU WORRIED THAT YOUR FOOD WOULD RUN OUT BEFORE YOU GOT THE MONEY TO BUY MORE: NEVER TRUE
HOW OFTEN DO YOU HAVE A DRINK CONTAINING ALCOHOL: NEVER
HOW MANY DRINKS CONTAINING ALCOHOL DO YOU HAVE ON A TYPICAL DAY WHEN YOU ARE DRINKING: PATIENT DOES NOT DRINK

## 2024-08-20 ASSESSMENT — LIFESTYLE VARIABLES
AUDIT-C TOTAL SCORE: 0
HOW MANY STANDARD DRINKS CONTAINING ALCOHOL DO YOU HAVE ON A TYPICAL DAY: PATIENT DOES NOT DRINK
HOW OFTEN DO YOU HAVE SIX OR MORE DRINKS ON ONE OCCASION: NEVER
HOW OFTEN DO YOU HAVE A DRINK CONTAINING ALCOHOL: NEVER
SKIP TO QUESTIONS 9-10: 1

## 2024-08-23 ENCOUNTER — OFFICE VISIT (OUTPATIENT)
Dept: MEDICAL GROUP | Facility: PHYSICIAN GROUP | Age: 64
End: 2024-08-23
Payer: COMMERCIAL

## 2024-08-23 VITALS
WEIGHT: 166.9 LBS | BODY MASS INDEX: 29.57 KG/M2 | DIASTOLIC BLOOD PRESSURE: 76 MMHG | HEIGHT: 63 IN | SYSTOLIC BLOOD PRESSURE: 124 MMHG | HEART RATE: 84 BPM | TEMPERATURE: 97.7 F | OXYGEN SATURATION: 98 %

## 2024-08-23 DIAGNOSIS — G43.109 MIGRAINE WITH AURA AND WITHOUT STATUS MIGRAINOSUS, NOT INTRACTABLE: ICD-10-CM

## 2024-08-23 DIAGNOSIS — Z76.89 ENCOUNTER TO ESTABLISH CARE: ICD-10-CM

## 2024-08-23 DIAGNOSIS — G25.81 RESTLESS LEG SYNDROME: ICD-10-CM

## 2024-08-23 DIAGNOSIS — R42 VERTIGO: ICD-10-CM

## 2024-08-23 DIAGNOSIS — K63.5 POLYP OF COLON, UNSPECIFIED PART OF COLON, UNSPECIFIED TYPE: ICD-10-CM

## 2024-08-23 DIAGNOSIS — E78.5 HYPERLIPIDEMIA WITH TARGET LDL LESS THAN 130: ICD-10-CM

## 2024-08-23 DIAGNOSIS — E04.9 GOITER: ICD-10-CM

## 2024-08-23 DIAGNOSIS — K57.30 DIVERTICULOSIS OF COLON: ICD-10-CM

## 2024-08-23 DIAGNOSIS — L30.8 OTHER ECZEMA: ICD-10-CM

## 2024-08-23 DIAGNOSIS — M51.36 DDD (DEGENERATIVE DISC DISEASE), LUMBAR: ICD-10-CM

## 2024-08-23 DIAGNOSIS — I10 PRIMARY HYPERTENSION: ICD-10-CM

## 2024-08-23 DIAGNOSIS — Z86.2 HISTORY OF ANEMIA: ICD-10-CM

## 2024-08-23 DIAGNOSIS — M50.30 DDD (DEGENERATIVE DISC DISEASE), CERVICAL: ICD-10-CM

## 2024-08-23 DIAGNOSIS — R73.03 PREDIABETES: Chronic | ICD-10-CM

## 2024-08-23 PROBLEM — Z86.16 HISTORY OF 2019 NOVEL CORONAVIRUS DISEASE (COVID-19): Status: RESOLVED | Noted: 2021-03-23 | Resolved: 2024-08-23

## 2024-08-23 PROBLEM — E66.9 OBESITY (BMI 30-39.9): Status: RESOLVED | Noted: 2024-04-05 | Resolved: 2024-08-23

## 2024-08-23 PROBLEM — K64.4 EXTERNAL HEMORRHOID: Status: RESOLVED | Noted: 2024-04-05 | Resolved: 2024-08-23

## 2024-08-23 PROCEDURE — 3074F SYST BP LT 130 MM HG: CPT | Performed by: STUDENT IN AN ORGANIZED HEALTH CARE EDUCATION/TRAINING PROGRAM

## 2024-08-23 PROCEDURE — 99214 OFFICE O/P EST MOD 30 MIN: CPT | Performed by: STUDENT IN AN ORGANIZED HEALTH CARE EDUCATION/TRAINING PROGRAM

## 2024-08-23 PROCEDURE — 3078F DIAST BP <80 MM HG: CPT | Performed by: STUDENT IN AN ORGANIZED HEALTH CARE EDUCATION/TRAINING PROGRAM

## 2024-08-23 RX ORDER — MECLIZINE HCL 12.5 MG 12.5 MG/1
12.5 TABLET ORAL 3 TIMES DAILY PRN
Qty: 30 TABLET | Refills: 3 | Status: SHIPPED | OUTPATIENT
Start: 2024-08-23

## 2024-08-23 RX ORDER — ATORVASTATIN CALCIUM 20 MG/1
20 TABLET, FILM COATED ORAL NIGHTLY
Qty: 90 TABLET | Refills: 3 | Status: SHIPPED | OUTPATIENT
Start: 2024-08-23

## 2024-08-23 RX ORDER — TRIAMCINOLONE ACETONIDE 1 MG/G
OINTMENT TOPICAL
Qty: 80 G | Refills: 1 | Status: SHIPPED | OUTPATIENT
Start: 2024-08-23

## 2024-08-23 RX ORDER — LOSARTAN POTASSIUM 25 MG/1
25 TABLET ORAL DAILY
Qty: 90 TABLET | Refills: 3 | Status: SHIPPED | OUTPATIENT
Start: 2024-08-23

## 2024-08-23 ASSESSMENT — ENCOUNTER SYMPTOMS
CHILLS: 0
PALPITATIONS: 0
SHORTNESS OF BREATH: 0
FEVER: 0

## 2024-08-23 ASSESSMENT — FIBROSIS 4 INDEX: FIB4 SCORE: 0.83

## 2024-08-23 NOTE — PROGRESS NOTES
Subjective:   Verbal consent was acquired by the patient to use [employer] ambient listening note generation during this visit Yes     CC:  Diagnoses of Encounter to establish care, Primary hypertension, Hyperlipidemia with target LDL less than 130, Prediabetes, Goiter, Migraine with aura and without status migrainosus, not intractable, Vertigo, Other eczema, Restless leg syndrome, History of anemia, Polyp of colon, unspecified part of colon, unspecified type, Diverticulosis of colon, DDD (degenerative disc disease), cervical, and DDD (degenerative disc disease), lumbar were pertinent to this visit.      History of Present Illness  Patient is a 64 y.o. female. This pleasant patient is here today to establish care     She occasionally monitors her blood pressure at home, which has been within the normal range. Her current medication includes losartan 25 mg for hypertension.    She was previously on Lipitor for high cholesterol, but it was discontinued and then restarted in January 2024. She reports no side effects from this medication. Her cholesterol levels have significantly decreased, and she is working on improving her A1c levels. She had blood work done in April 2024 and a lipid panel in May 2024.    Her migraines are generally well-managed, with occasional episodes treated with Advil. The onset of her migraines is often signaled by visual disturbances such as squiggly lights and partial vision loss.    She has a history of prediabetes and was prescribed metformin, but it caused stomach discomfort. She has since been managing her condition through dietary changes.    She underwent a hysterectomy and reconstructive surgery in November 2023. Post-surgery, she experienced constipation and cramping, but these symptoms have improved. She also reports excessive gas. Her gynecologist is managing her estradiol vaginal cream. She had a previous concern for hemorrhoids, which has since improved.    She has a history of  eczema, which causes her hands to break out, particularly in sunlight. She uses a cream to manage this.    She had an ultrasound for a thyroid goiter but has not received any updates since.    She has degenerative disc disease in her spine, which is currently under control. She attended physical therapy for a period, which was beneficial. She experiences shoulder tightness when stressed.    She has bilateral hand pain, which she attributes to arthritis. This continues to cause her discomfort.    She had colon polyps removed during a colonoscopy three years ago and was diagnosed with diverticulosis. She is due for another colonoscopy in two years.    She takes over-the-counter calcium with vitamin D. She needs a refill for her meclizine prescription. She experiences vertigo, which can be severe enough to incapacitate her for several days, although these episodes are infrequent. She also needs a refill for her Kenalog cream.    She had a mammogram in 2024.    She has a history of iron deficiency during pregnancy and experiences leg pain at night.    She has been following up with [facility] every six months to a year.    Supplemental Information:  She had her tonsils removed. She had tendon repair. She had a . She had a mass excision in 2013.    SOCIAL HISTORY  The patient denies any history of vaping or smoking. She denies alcohol intake. She denies drug use.    FAMILY HISTORY  Her mother had heart disease, arrhythmia, high blood pressure, high cholesterol, and arthritis, osteoporosis. Her father also had heart disease. One brother with high cholesterol, history of stroke and fibromyalgia. Her oldest brother has sleep apnea. Maternal aunt had diabetes and history of stroke as well as maternal uncle had a stroke. Maternal grandfather had heart attack at age 54. Paternal grandmother had diabetes. She denies any family history of colon cancer or breast cancer.    Patient Active Problem List    Diagnosis Date  "Noted    Eczema 05/07/2023    Vitamin D deficiency 05/07/2023    Prediabetes 08/27/2022    Vertigo 08/27/2022    Bilateral hand pain 11/09/2021    Diverticulosis of colon 07/31/2021    Sun-damaged skin 04/30/2018    DDD (degenerative disc disease), lumbar 07/24/2015    DDD (degenerative disc disease), cervical 02/27/2015    Hyperlipidemia with target LDL less than 130 06/29/2012    Colon polyps 04/06/2012    HTN (hypertension) 11/04/2011    Goiter     Migraine with aura and without status migrainosus, not intractable        Health Maintenance: Completed    ROS:   Review of Systems   Constitutional:  Negative for chills and fever.   Respiratory:  Negative for shortness of breath.    Cardiovascular:  Negative for chest pain and palpitations.         Objective:     Exam: /76 (BP Location: Right arm, Patient Position: Sitting, BP Cuff Size: Adult long)   Pulse 84   Temp 36.5 °C (97.7 °F) (Temporal)   Ht 1.61 m (5' 3.39\")   Wt 75.7 kg (166 lb 14.4 oz)   SpO2 98%  Body mass index is 29.21 kg/m².    Physical Exam  Constitutional:       Appearance: Normal appearance.   Cardiovascular:      Rate and Rhythm: Normal rate and regular rhythm.      Heart sounds: Normal heart sounds.   Pulmonary:      Effort: Pulmonary effort is normal. No respiratory distress.      Breath sounds: Normal breath sounds. No stridor. No wheezing, rhonchi or rales.   Neurological:      Mental Status: She is alert.             Assessment & Plan:   64 y.o. female with the following -    1. Encounter to establish care  Patient presents today to establish care. Chart was reviewed and history was discussed in detail with the patient.    2. Primary hypertension  Chronic, controlled. Blood pressure is at goal. Continue Losartan 25mg qd.  - losartan (COZAAR) 25 MG Tab; Take 1 Tablet by mouth every day.  Dispense: 90 Tablet; Refill: 3  - Comp Metabolic Panel; Future    3. Hyperlipidemia with target LDL less than 130  Chronic, controlled. Lipid panel " from 5/2024 with normal values. Continue Lipitor 20mg qd.  - atorvastatin (LIPITOR) 20 MG Tab; Take 1 Tablet by mouth every evening.  Dispense: 90 Tablet; Refill: 3  - Lipid Profile; Future    4. Prediabetes  Chronic, stable. A1c from 12/2023 was 6.1%.  - HEMOGLOBIN A1C; Future    5. Goiter  Chronic, stable. Patient had thyroid ultrasound completed in 4/2005 which showed mild thyromegaly. Patients TSH from 7/2023 was normal.  - TSH WITH REFLEX TO FT4; Future    6. Migraine with aura and without status migrainosus, not intractable  Chronic, stable. Continue to take Advil as needed for migraine headaches.    7. Vertigo  Chronic, stable. Patient requesting refill for meclizine 12.5mg TID as needed  - meclizine (ANTIVERT) 12.5 MG Tab; Take 1 Tablet by mouth 3 times a day as needed for Dizziness.  Dispense: 30 Tablet; Refill: 3    8. Other eczema  Chronic, stable. Refill for Kenalog sent to patients pharmacy.  - triamcinolone acetonide (KENALOG) 0.1 % Ointment; Please apply 1 application twice daily for no more than 14 days  Dispense: 80 g; Refill: 1    9. Restless leg syndrome  Patient has concerns for restless leg syndrome. She would like to have iron panel checked.  - IRON/TOTAL IRON BIND; Future  - FERRITIN; Future    10. History of anemia  - IRON/TOTAL IRON BIND; Future  - FERRITIN; Future  - CBC WITH DIFFERENTIAL; Future    11. Polyp of colon, unspecified part of colon, unspecified type  12. Diverticulosis of colon  Patient with history of colon polyps and diverticulosis.  Her last colonoscopy was completed in 7/2021.  Multiple diverticuli were noted on the left side of the colon.  Recall colonoscopy in 5 years.  Patient will have her next colonoscopy in 7/2026.    13. DDD (degenerative disc disease), cervical  14. DDD (degenerative disc disease), lumbar  Chronic, stable.          Return in about 6 months (around 2/23/2025) for Chronic Conditions.    Please note that this dictation was created using voice  recognition software. I have made every reasonable attempt to correct obvious errors, but I expect that there are errors of grammar and possibly content that I did not discover before finalizing the note.

## 2025-01-17 ENCOUNTER — APPOINTMENT (OUTPATIENT)
Dept: URBAN - METROPOLITAN AREA CLINIC 22 | Facility: CLINIC | Age: 65
Setting detail: DERMATOLOGY
End: 2025-01-17

## 2025-01-17 DIAGNOSIS — Z71.89 OTHER SPECIFIED COUNSELING: ICD-10-CM

## 2025-01-17 DIAGNOSIS — L81.4 OTHER MELANIN HYPERPIGMENTATION: ICD-10-CM

## 2025-01-17 DIAGNOSIS — L82.1 OTHER SEBORRHEIC KERATOSIS: ICD-10-CM

## 2025-01-17 DIAGNOSIS — L72.0 EPIDERMAL CYST: ICD-10-CM

## 2025-01-17 DIAGNOSIS — D22 MELANOCYTIC NEVI: ICD-10-CM

## 2025-01-17 PROBLEM — D22.5 MELANOCYTIC NEVI OF TRUNK: Status: ACTIVE | Noted: 2025-01-17

## 2025-01-17 PROCEDURE — ? COUNSELING

## 2025-01-17 PROCEDURE — ? SUNSCREEN RECOMMENDATIONS

## 2025-01-17 PROCEDURE — 99213 OFFICE O/P EST LOW 20 MIN: CPT

## 2025-01-17 ASSESSMENT — LOCATION SIMPLE DESCRIPTION DERM
LOCATION SIMPLE: UPPER BACK
LOCATION SIMPLE: RIGHT FOREARM
LOCATION SIMPLE: LEFT LOWER BACK
LOCATION SIMPLE: LEFT BUTTOCK
LOCATION SIMPLE: LOWER BACK
LOCATION SIMPLE: LEFT FOREARM
LOCATION SIMPLE: LEFT PRETIBIAL REGION
LOCATION SIMPLE: RIGHT PRETIBIAL REGION
LOCATION SIMPLE: ABDOMEN
LOCATION SIMPLE: LEFT UPPER BACK
LOCATION SIMPLE: RIGHT LOWER BACK
LOCATION SIMPLE: RIGHT ANTERIOR NECK
LOCATION SIMPLE: INFERIOR FOREHEAD

## 2025-01-17 ASSESSMENT — LOCATION DETAILED DESCRIPTION DERM
LOCATION DETAILED: LEFT BUTTOCK
LOCATION DETAILED: SUPERIOR THORACIC SPINE
LOCATION DETAILED: RIGHT SUPERIOR LATERAL LOWER BACK
LOCATION DETAILED: LEFT VENTRAL PROXIMAL FOREARM
LOCATION DETAILED: INFERIOR THORACIC SPINE
LOCATION DETAILED: SUBXIPHOID
LOCATION DETAILED: LEFT LATERAL DISTAL PRETIBIAL REGION
LOCATION DETAILED: RIGHT VENTRAL PROXIMAL FOREARM
LOCATION DETAILED: RIGHT INFERIOR LATERAL NECK
LOCATION DETAILED: SUPERIOR LUMBAR SPINE
LOCATION DETAILED: INFERIOR MID FOREHEAD
LOCATION DETAILED: LEFT MID-UPPER BACK
LOCATION DETAILED: RIGHT DISTAL PRETIBIAL REGION
LOCATION DETAILED: LEFT SUPERIOR MEDIAL LOWER BACK

## 2025-01-17 ASSESSMENT — LOCATION ZONE DERM
LOCATION ZONE: NECK
LOCATION ZONE: LEG
LOCATION ZONE: ARM
LOCATION ZONE: FACE
LOCATION ZONE: TRUNK

## 2025-01-17 NOTE — PROCEDURE: MIPS QUALITY
[FreeTextEntry9] : lower back pain
Detail Level: Detailed
Quality 226: Preventive Care And Screening: Tobacco Use: Screening And Cessation Intervention: Patient screened for tobacco use and is an ex/non-smoker
Quality 130: Documentation Of Current Medications In The Medical Record: Current Medications Documented

## 2025-02-03 DIAGNOSIS — Z00.6 CLINICAL TRIAL PARTICIPANT: ICD-10-CM

## 2025-02-04 ENCOUNTER — OFFICE VISIT (OUTPATIENT)
Dept: URGENT CARE | Facility: PHYSICIAN GROUP | Age: 65
End: 2025-02-04
Payer: COMMERCIAL

## 2025-02-04 VITALS
TEMPERATURE: 98.8 F | RESPIRATION RATE: 17 BRPM | HEART RATE: 108 BPM | DIASTOLIC BLOOD PRESSURE: 82 MMHG | SYSTOLIC BLOOD PRESSURE: 108 MMHG | BODY MASS INDEX: 30.27 KG/M2 | OXYGEN SATURATION: 97 % | WEIGHT: 170.86 LBS | HEIGHT: 63 IN

## 2025-02-04 DIAGNOSIS — J02.0 STREP PHARYNGITIS: ICD-10-CM

## 2025-02-04 DIAGNOSIS — K12.2 UVULITIS: ICD-10-CM

## 2025-02-04 LAB — S PYO DNA SPEC NAA+PROBE: DETECTED

## 2025-02-04 PROCEDURE — 3079F DIAST BP 80-89 MM HG: CPT | Performed by: PHYSICIAN ASSISTANT

## 2025-02-04 PROCEDURE — 87651 STREP A DNA AMP PROBE: CPT | Performed by: PHYSICIAN ASSISTANT

## 2025-02-04 PROCEDURE — 3074F SYST BP LT 130 MM HG: CPT | Performed by: PHYSICIAN ASSISTANT

## 2025-02-04 PROCEDURE — 99213 OFFICE O/P EST LOW 20 MIN: CPT | Performed by: PHYSICIAN ASSISTANT

## 2025-02-04 RX ORDER — DEXAMETHASONE SODIUM PHOSPHATE 10 MG/ML
10 INJECTION, SOLUTION INTRA-ARTICULAR; INTRALESIONAL; INTRAMUSCULAR; INTRAVENOUS; SOFT TISSUE ONCE
Status: COMPLETED | OUTPATIENT
Start: 2025-02-04 | End: 2025-02-04

## 2025-02-04 RX ADMIN — DEXAMETHASONE SODIUM PHOSPHATE 10 MG: 10 INJECTION, SOLUTION INTRA-ARTICULAR; INTRALESIONAL; INTRAMUSCULAR; INTRAVENOUS; SOFT TISSUE at 09:52

## 2025-02-04 ASSESSMENT — ENCOUNTER SYMPTOMS
VOMITING: 0
MYALGIAS: 1
WHEEZING: 0
COUGH: 0
TROUBLE SWALLOWING: 0
SORE THROAT: 1
STRIDOR: 0
FEVER: 1
DIARRHEA: 0
CHILLS: 0
SWOLLEN GLANDS: 1
SPUTUM PRODUCTION: 0
NAUSEA: 0
HEADACHES: 1
SHORTNESS OF BREATH: 0

## 2025-02-04 ASSESSMENT — FIBROSIS 4 INDEX: FIB4 SCORE: 0.83

## 2025-02-04 NOTE — LETTER
February 4, 2025         Patient: Laya Barnes   YOB: 1960   Date of Visit: 2/4/2025           To Whom it May Concern:    Laya Barnes was seen in my clinic on 2/4/2025. She should be excused from work 2/3/25-2/5/25 due to medical reasons.    If you have any questions or concerns, please don't hesitate to call.        Sincerely,           Vee Aparicio P.A.-C.  Electronically Signed

## 2025-02-08 ENCOUNTER — HOSPITAL ENCOUNTER (OUTPATIENT)
Dept: LAB | Facility: MEDICAL CENTER | Age: 65
End: 2025-02-08
Attending: FAMILY MEDICINE

## 2025-02-08 ENCOUNTER — HOSPITAL ENCOUNTER (OUTPATIENT)
Dept: LAB | Facility: MEDICAL CENTER | Age: 65
End: 2025-02-08
Attending: STUDENT IN AN ORGANIZED HEALTH CARE EDUCATION/TRAINING PROGRAM
Payer: COMMERCIAL

## 2025-02-08 DIAGNOSIS — Z00.6 CLINICAL TRIAL PARTICIPANT: ICD-10-CM

## 2025-02-08 DIAGNOSIS — R73.03 PREDIABETES: Chronic | ICD-10-CM

## 2025-02-08 DIAGNOSIS — G25.81 RESTLESS LEG SYNDROME: ICD-10-CM

## 2025-02-08 DIAGNOSIS — E04.9 GOITER: ICD-10-CM

## 2025-02-08 DIAGNOSIS — E78.5 HYPERLIPIDEMIA WITH TARGET LDL LESS THAN 130: ICD-10-CM

## 2025-02-08 DIAGNOSIS — Z86.2 HISTORY OF ANEMIA: ICD-10-CM

## 2025-02-08 DIAGNOSIS — I10 PRIMARY HYPERTENSION: ICD-10-CM

## 2025-02-08 LAB
ALBUMIN SERPL BCP-MCNC: 4.1 G/DL (ref 3.2–4.9)
ALBUMIN/GLOB SERPL: 1.3 G/DL
ALP SERPL-CCNC: 95 U/L (ref 30–99)
ALT SERPL-CCNC: 22 U/L (ref 2–50)
ANION GAP SERPL CALC-SCNC: 9 MMOL/L (ref 7–16)
AST SERPL-CCNC: 23 U/L (ref 12–45)
BASOPHILS # BLD AUTO: 0.5 % (ref 0–1.8)
BASOPHILS # BLD: 0.03 K/UL (ref 0–0.12)
BILIRUB SERPL-MCNC: 0.3 MG/DL (ref 0.1–1.5)
BUN SERPL-MCNC: 19 MG/DL (ref 8–22)
CALCIUM ALBUM COR SERPL-MCNC: 9.1 MG/DL (ref 8.5–10.5)
CALCIUM SERPL-MCNC: 9.2 MG/DL (ref 8.5–10.5)
CHLORIDE SERPL-SCNC: 106 MMOL/L (ref 96–112)
CHOLEST SERPL-MCNC: 140 MG/DL (ref 100–199)
CO2 SERPL-SCNC: 25 MMOL/L (ref 20–33)
CREAT SERPL-MCNC: 1.01 MG/DL (ref 0.5–1.4)
EOSINOPHIL # BLD AUTO: 0.11 K/UL (ref 0–0.51)
EOSINOPHIL NFR BLD: 1.8 % (ref 0–6.9)
ERYTHROCYTE [DISTWIDTH] IN BLOOD BY AUTOMATED COUNT: 41.7 FL (ref 35.9–50)
EST. AVERAGE GLUCOSE BLD GHB EST-MCNC: 126 MG/DL
FERRITIN SERPL-MCNC: 69.8 NG/ML (ref 10–291)
GFR SERPLBLD CREATININE-BSD FMLA CKD-EPI: 62 ML/MIN/1.73 M 2
GLOBULIN SER CALC-MCNC: 3.1 G/DL (ref 1.9–3.5)
GLUCOSE SERPL-MCNC: 97 MG/DL (ref 65–99)
HBA1C MFR BLD: 6 % (ref 4–5.6)
HCT VFR BLD AUTO: 44.3 % (ref 37–47)
HDLC SERPL-MCNC: 41 MG/DL
HGB BLD-MCNC: 13.6 G/DL (ref 12–16)
IMM GRANULOCYTES # BLD AUTO: 0.02 K/UL (ref 0–0.11)
IMM GRANULOCYTES NFR BLD AUTO: 0.3 % (ref 0–0.9)
IRON SATN MFR SERPL: 30 % (ref 15–55)
IRON SERPL-MCNC: 84 UG/DL (ref 40–170)
LDLC SERPL CALC-MCNC: 72 MG/DL
LYMPHOCYTES # BLD AUTO: 1.76 K/UL (ref 1–4.8)
LYMPHOCYTES NFR BLD: 29.5 % (ref 22–41)
MCH RBC QN AUTO: 27.1 PG (ref 27–33)
MCHC RBC AUTO-ENTMCNC: 30.7 G/DL (ref 32.2–35.5)
MCV RBC AUTO: 88.4 FL (ref 81.4–97.8)
MONOCYTES # BLD AUTO: 0.45 K/UL (ref 0–0.85)
MONOCYTES NFR BLD AUTO: 7.5 % (ref 0–13.4)
NEUTROPHILS # BLD AUTO: 3.6 K/UL (ref 1.82–7.42)
NEUTROPHILS NFR BLD: 60.4 % (ref 44–72)
NRBC # BLD AUTO: 0 K/UL
NRBC BLD-RTO: 0 /100 WBC (ref 0–0.2)
PLATELET # BLD AUTO: 293 K/UL (ref 164–446)
PMV BLD AUTO: 9.9 FL (ref 9–12.9)
POTASSIUM SERPL-SCNC: 4.3 MMOL/L (ref 3.6–5.5)
PROT SERPL-MCNC: 7.2 G/DL (ref 6–8.2)
RBC # BLD AUTO: 5.01 M/UL (ref 4.2–5.4)
SODIUM SERPL-SCNC: 140 MMOL/L (ref 135–145)
TIBC SERPL-MCNC: 278 UG/DL (ref 250–450)
TRIGL SERPL-MCNC: 133 MG/DL (ref 0–149)
TSH SERPL DL<=0.005 MIU/L-ACNC: 1.8 UIU/ML (ref 0.38–5.33)
UIBC SERPL-MCNC: 194 UG/DL (ref 110–370)
WBC # BLD AUTO: 6 K/UL (ref 4.8–10.8)

## 2025-02-08 PROCEDURE — 83550 IRON BINDING TEST: CPT

## 2025-02-08 PROCEDURE — 82728 ASSAY OF FERRITIN: CPT

## 2025-02-08 PROCEDURE — 84443 ASSAY THYROID STIM HORMONE: CPT

## 2025-02-08 PROCEDURE — 36415 COLL VENOUS BLD VENIPUNCTURE: CPT

## 2025-02-08 PROCEDURE — 83036 HEMOGLOBIN GLYCOSYLATED A1C: CPT

## 2025-02-08 PROCEDURE — 83540 ASSAY OF IRON: CPT

## 2025-02-08 PROCEDURE — 85025 COMPLETE CBC W/AUTO DIFF WBC: CPT

## 2025-02-08 PROCEDURE — 80053 COMPREHEN METABOLIC PANEL: CPT

## 2025-02-08 PROCEDURE — 80061 LIPID PANEL: CPT

## 2025-02-12 ENCOUNTER — RESULTS FOLLOW-UP (OUTPATIENT)
Dept: MEDICAL GROUP | Facility: PHYSICIAN GROUP | Age: 65
End: 2025-02-12

## 2025-02-12 LAB
ELF SCORE: 9.78 -
HA (HYALURONIC ACID): 93.23 NG/ML
PIIINP (AMINO-TERMINAL PROPEPTIDE): 7.14 NG/ML
RELATIVE RISK: NORMAL
RISK GROUP: NORMAL
RISK: 3.3 %
TIMP-1 (TISSUE INHIBITOR OF MMP1): 243.5 NG/ML

## 2025-02-18 ENCOUNTER — RESULTS FOLLOW-UP (OUTPATIENT)
Dept: MEDICAL GROUP | Facility: PHYSICIAN GROUP | Age: 65
End: 2025-02-18

## 2025-02-20 LAB
APOB+LDLR+PCSK9 GENE MUT ANL BLD/T: NOT DETECTED
BRCA1+BRCA2 DEL+DUP + FULL MUT ANL BLD/T: NOT DETECTED
MLH1+MSH2+MSH6+PMS2 GN DEL+DUP+FUL M: NOT DETECTED

## 2025-03-03 ENCOUNTER — OFFICE VISIT (OUTPATIENT)
Dept: URGENT CARE | Facility: PHYSICIAN GROUP | Age: 65
End: 2025-03-03
Payer: COMMERCIAL

## 2025-03-03 ENCOUNTER — RESULTS FOLLOW-UP (OUTPATIENT)
Dept: URGENT CARE | Facility: PHYSICIAN GROUP | Age: 65
End: 2025-03-03

## 2025-03-03 VITALS
OXYGEN SATURATION: 96 % | SYSTOLIC BLOOD PRESSURE: 112 MMHG | RESPIRATION RATE: 18 BRPM | HEART RATE: 104 BPM | BODY MASS INDEX: 29.23 KG/M2 | DIASTOLIC BLOOD PRESSURE: 80 MMHG | HEIGHT: 63 IN | WEIGHT: 165 LBS | TEMPERATURE: 97.9 F

## 2025-03-03 DIAGNOSIS — J02.0 PHARYNGITIS DUE TO STREPTOCOCCUS SPECIES: ICD-10-CM

## 2025-03-03 LAB — S PYO DNA SPEC NAA+PROBE: DETECTED

## 2025-03-03 PROCEDURE — 3074F SYST BP LT 130 MM HG: CPT | Performed by: PHYSICIAN ASSISTANT

## 2025-03-03 PROCEDURE — 99213 OFFICE O/P EST LOW 20 MIN: CPT | Performed by: PHYSICIAN ASSISTANT

## 2025-03-03 PROCEDURE — 3079F DIAST BP 80-89 MM HG: CPT | Performed by: PHYSICIAN ASSISTANT

## 2025-03-03 PROCEDURE — 87651 STREP A DNA AMP PROBE: CPT | Performed by: PHYSICIAN ASSISTANT

## 2025-03-03 RX ORDER — DEXAMETHASONE SODIUM PHOSPHATE 10 MG/ML
10 INJECTION, SOLUTION INTRA-ARTICULAR; INTRALESIONAL; INTRAMUSCULAR; INTRAVENOUS; SOFT TISSUE ONCE
Status: COMPLETED | OUTPATIENT
Start: 2025-03-03 | End: 2025-03-03

## 2025-03-03 RX ORDER — CEPHALEXIN 500 MG/1
500 CAPSULE ORAL 2 TIMES DAILY
Qty: 20 CAPSULE | Refills: 0 | Status: SHIPPED | OUTPATIENT
Start: 2025-03-03 | End: 2025-03-13

## 2025-03-03 RX ADMIN — DEXAMETHASONE SODIUM PHOSPHATE 10 MG: 10 INJECTION, SOLUTION INTRA-ARTICULAR; INTRALESIONAL; INTRAMUSCULAR; INTRAVENOUS; SOFT TISSUE at 11:28

## 2025-03-03 ASSESSMENT — ENCOUNTER SYMPTOMS
SORE THROAT: 1
CHILLS: 0
COUGH: 0
WHEEZING: 0
SHORTNESS OF BREATH: 0
SINUS PAIN: 0
DIZZINESS: 0
FEVER: 0
DIAPHORESIS: 0
HEADACHES: 0

## 2025-03-03 ASSESSMENT — FIBROSIS 4 INDEX: FIB4 SCORE: 1.07

## 2025-03-03 NOTE — PROGRESS NOTES
Subjective:     Laya Barnes  is a 64 y.o. female who presents for Pharyngitis (Presented to  last month w strep, is having similar sx again. Sx this time x 4 days. Swelling so severe last night it felt like it interfered with breathing.)       She presents today with sore throat that began 4 days ago but did become abruptly worse within the last 12 hours.  Please recall roughly 1 month ago she was diagnosed with strep throat, did fully complete the antibiotics and her symptoms did fully resolve at that time.  Current symptoms do feel similar to those previously experienced.  Is having pain with swallowing, no difficulties with swallowing.  No burning sensation of the tongue, lips or mucosal tissues, no change in taste.  No fevers, no chest pain or shortness of breath       Review of Systems   Constitutional:  Negative for chills, diaphoresis, fever and malaise/fatigue.   HENT:  Positive for sore throat. Negative for congestion, ear discharge and sinus pain.    Respiratory:  Negative for cough, shortness of breath and wheezing.    Cardiovascular:  Negative for chest pain.   Neurological:  Negative for dizziness and headaches.      No Known Allergies  Past Medical History:   Diagnosis Date    Bilateral foot pain 11/09/2015    Bronchitis     Chickenpox     Colon polyps 06/2011    Tubular Adenoma    External hemorrhoid 04/05/2024    Female bladder prolapse 06/16/2016    Managed by Dr Ly.   Laser therapy annually  Estradiol vag cream 0.1%       Flying phobia 11/04/2011    Gasping for breath     Goiter     Hand eczema 08/04/2014    History of 2019 novel coronavirus disease (COVID-19) 03/23/2021    HTN (hypertension) 11/04/2011    Hyperlipidemia LDL goal < 130 06/29/2012    Hypertension     Hypertriglyceridemia     Migraine     Obesity (BMI 30-39.9) 04/05/2024    Pneumonia     Shortness of breath     Wears glasses         Objective:   /80 (BP Location: Left arm, Patient Position: Sitting, BP Cuff  "Size: Adult long)   Pulse (!) 104   Temp 36.6 °C (97.9 °F) (Temporal)   Resp 18   Ht 1.6 m (5' 3\")   Wt 74.8 kg (165 lb)   SpO2 96%   BMI 29.23 kg/m²   Physical Exam  Vitals and nursing note reviewed.   Constitutional:       General: She is not in acute distress.     Appearance: Normal appearance. She is not ill-appearing, toxic-appearing or diaphoretic.   HENT:      Head: Normocephalic.      Right Ear: Tympanic membrane, ear canal and external ear normal. There is no impacted cerumen.      Left Ear: Tympanic membrane, ear canal and external ear normal. There is no impacted cerumen.      Nose: No congestion or rhinorrhea.      Mouth/Throat:      Mouth: Mucous membranes are moist.      Pharynx: Posterior oropharyngeal erythema present. No oropharyngeal exudate.      Comments: Grade 1 tonsillar swelling, bilaterally.  No soft tissue swelling of the sublingual mucosa, no swelling of the soft or hard palate, no unilarteral oral pharynx swelling, no uvular deviation.  Eyes:      General:         Right eye: No discharge.         Left eye: No discharge.      Conjunctiva/sclera: Conjunctivae normal.   Cardiovascular:      Rate and Rhythm: Normal rate and regular rhythm.   Pulmonary:      Effort: Pulmonary effort is normal. No respiratory distress.      Breath sounds: Normal breath sounds. No stridor. No wheezing or rhonchi.   Musculoskeletal:      Cervical back: Neck supple.   Lymphadenopathy:      Cervical: No cervical adenopathy.   Neurological:      General: No focal deficit present.      Mental Status: She is alert and oriented to person, place, and time.   Psychiatric:         Mood and Affect: Mood normal.         Behavior: Behavior normal.         Thought Content: Thought content normal.         Judgment: Judgment normal.             Diagnostic testing:    Cephid Strep -positive, notified via [employer] message    Assessment/Plan:     Encounter Diagnoses   Name Primary?    Pharyngitis due to Streptococcus species  "         Obtaining strep test today, this was positive.  Will start patient on Keflex due to recent amoxicillin use for recent strep infection.  Did discuss appropriate hygiene techniques to prevent coinfection or reinfection. This includes getting a new tooth brush after 24 hours of antibiotic treatment, cleaning all water bottles and other objects that go in and around the mouth.  Frequent handwashing and cleaning of commonly touched surfaces.  Vital signs were stable during today's office visit, patient was overall well-appearing. Continue to monitor symptoms and return to urgent care or follow-up with primary care provider if symptoms remain ongoing.  Follow-up in the emergency department if symptoms become severe, ER precautions discussed in office today.    See AVS Instructions below for written guidance provided to patient on after-visit management and care in addition to our verbal discussion during the visit.    Please note that this dictation was created using voice recognition software. I have attempted to correct all errors, but there may be sound-alike, spelling, grammar and possibly content errors that I did not discover before finalizing the note.    Ernie Avery PA-C

## 2025-03-04 ENCOUNTER — APPOINTMENT (OUTPATIENT)
Dept: MEDICAL GROUP | Facility: PHYSICIAN GROUP | Age: 65
End: 2025-03-04
Payer: COMMERCIAL

## 2025-03-04 VITALS
HEIGHT: 63 IN | DIASTOLIC BLOOD PRESSURE: 80 MMHG | TEMPERATURE: 98 F | SYSTOLIC BLOOD PRESSURE: 120 MMHG | BODY MASS INDEX: 30.48 KG/M2 | OXYGEN SATURATION: 98 % | HEART RATE: 77 BPM | WEIGHT: 172 LBS

## 2025-03-04 DIAGNOSIS — E66.9 OBESITY (BMI 30-39.9): ICD-10-CM

## 2025-03-04 DIAGNOSIS — E78.5 HYPERLIPIDEMIA WITH TARGET LDL LESS THAN 130: ICD-10-CM

## 2025-03-04 DIAGNOSIS — R73.03 PREDIABETES: Chronic | ICD-10-CM

## 2025-03-04 DIAGNOSIS — I10 PRIMARY HYPERTENSION: Chronic | ICD-10-CM

## 2025-03-04 DIAGNOSIS — Z00.00 WELLNESS EXAMINATION: ICD-10-CM

## 2025-03-04 PROCEDURE — 99214 OFFICE O/P EST MOD 30 MIN: CPT | Performed by: STUDENT IN AN ORGANIZED HEALTH CARE EDUCATION/TRAINING PROGRAM

## 2025-03-04 PROCEDURE — 3074F SYST BP LT 130 MM HG: CPT | Performed by: STUDENT IN AN ORGANIZED HEALTH CARE EDUCATION/TRAINING PROGRAM

## 2025-03-04 PROCEDURE — 3079F DIAST BP 80-89 MM HG: CPT | Performed by: STUDENT IN AN ORGANIZED HEALTH CARE EDUCATION/TRAINING PROGRAM

## 2025-03-04 ASSESSMENT — ENCOUNTER SYMPTOMS
CHILLS: 0
PALPITATIONS: 0
SHORTNESS OF BREATH: 0
FEVER: 0

## 2025-03-04 ASSESSMENT — PATIENT HEALTH QUESTIONNAIRE - PHQ9: CLINICAL INTERPRETATION OF PHQ2 SCORE: 0

## 2025-03-04 ASSESSMENT — FIBROSIS 4 INDEX: FIB4 SCORE: 1.07

## 2025-03-04 NOTE — PROGRESS NOTES
Subjective:   Verbal consent was acquired by the patient to use [employer] ambient listening note generation during this visit Yes     CC: Follow-up on labs    History of Present Illness  Ms. Barnes is a pleasant 64-year-old who presents today for lab follow-up and right hand pain.    She reports experiencing significant pain in her hand, particularly at night, accompanied by a popping sensation during movement. The pain is severe enough to disrupt her sleep and intensifies with prolonged writing activities.    She has been incorporating more protein into her diet and consuming natural sugars from fruits. She maintains an active lifestyle, engaging in daily physical activities such as rebounding and jumping. She has expressed interest in the potential benefits of Ozempic for weight loss, citing a friend's positive experience. She also queries about the potential impact of cinnamon on her A1c levels. Her current diet includes Diet Coke and a high water intake. She has been following a nutritionist's advice to consume 30 g of protein per meal and has calculated her daily protein needs based on her weight. Her exercise routine includes using a rebounder, lifting weights, and performing leg lifts against a wall. She aims to achieve between 8,000 to 10,000 steps daily.    Supplemental Information  She is just getting over strep.    MEDICATIONS  Current: Lipitor 20 mg, vitamin D, calcium, Keflex, estradiol vaginal cream, Flonase, losartan 25 mg, meclizine, Kenalog ointment    Patient Active Problem List    Diagnosis Date Noted    Eczema 05/07/2023    Vitamin D deficiency 05/07/2023    Prediabetes 08/27/2022    Vertigo 08/27/2022    Bilateral hand pain 11/09/2021    Diverticulosis of colon 07/31/2021    Sun-damaged skin 04/30/2018    DDD (degenerative disc disease), lumbar 07/24/2015    DDD (degenerative disc disease), cervical 02/27/2015    Hyperlipidemia with target LDL less than 130 06/29/2012    Colon polyps  "04/06/2012    HTN (hypertension) 11/04/2011    Goiter     Migraine with aura and without status migrainosus, not intractable          Health Maintenance: Completed    ROS:  Review of Systems   Constitutional:  Negative for chills and fever.   Respiratory:  Negative for shortness of breath.    Cardiovascular:  Negative for chest pain and palpitations.       Objective:     Exam:  /80 (BP Location: Left arm, Patient Position: Sitting, BP Cuff Size: Adult)   Pulse 77   Temp 36.7 °C (98 °F) (Temporal)   Ht 1.6 m (5' 3\")   Wt 78 kg (172 lb)   SpO2 98%   BMI 30.47 kg/m²  Body mass index is 30.47 kg/m².    Physical Exam  Constitutional:       Appearance: Normal appearance.   Eyes:      Extraocular Movements: Extraocular movements intact.   Neurological:      Mental Status: She is alert.   Psychiatric:         Mood and Affect: Mood normal.             Labs:    Latest Reference Range & Units 02/08/25 10:43   WBC 4.8 - 10.8 K/uL 6.0   RBC 4.20 - 5.40 M/uL 5.01   Hemoglobin 12.0 - 16.0 g/dL 13.6   Hematocrit 37.0 - 47.0 % 44.3   MCV 81.4 - 97.8 fL 88.4   MCH 27.0 - 33.0 pg 27.1   MCHC 32.2 - 35.5 g/dL 30.7 (L)   RDW 35.9 - 50.0 fL 41.7   Platelet Count 164 - 446 K/uL 293   MPV 9.0 - 12.9 fL 9.9   Neutrophils-Polys 44.00 - 72.00 % 60.40   Neutrophils (Absolute) 1.82 - 7.42 K/uL 3.60   Lymphocytes 22.00 - 41.00 % 29.50   Lymphs (Absolute) 1.00 - 4.80 K/uL 1.76   Monocytes 0.00 - 13.40 % 7.50   Monos (Absolute) 0.00 - 0.85 K/uL 0.45   Eosinophils 0.00 - 6.90 % 1.80   Eos (Absolute) 0.00 - 0.51 K/uL 0.11   Basophils 0.00 - 1.80 % 0.50   Baso (Absolute) 0.00 - 0.12 K/uL 0.03   Immature Granulocytes 0.00 - 0.90 % 0.30   Immature Granulocytes (abs) 0.00 - 0.11 K/uL 0.02   Nucleated RBC 0.00 - 0.20 /100 WBC 0.00   NRBC (Absolute) K/uL 0.00   Sodium 135 - 145 mmol/L 140   Potassium 3.6 - 5.5 mmol/L 4.3   Chloride 96 - 112 mmol/L 106   Co2 20 - 33 mmol/L 25   Anion Gap 7.0 - 16.0  9.0   Glucose 65 - 99 mg/dL 97   Bun 8 - 22 " mg/dL 19   Creatinine 0.50 - 1.40 mg/dL 1.01   GFR (CKD-EPI) >60 mL/min/1.73 m 2 62   Calcium 8.5 - 10.5 mg/dL 9.2   Correct Calcium 8.5 - 10.5 mg/dL 9.1   AST(SGOT) 12 - 45 U/L 23   ALT(SGPT) 2 - 50 U/L 22   Alkaline Phosphatase 30 - 99 U/L 95   Total Bilirubin 0.1 - 1.5 mg/dL 0.3   Albumin 3.2 - 4.9 g/dL 4.1   Total Protein 6.0 - 8.2 g/dL 7.2   Globulin 1.9 - 3.5 g/dL 3.1   A-G Ratio g/dL 1.3   Iron 40 - 170 ug/dL 84   Total Iron Binding 250 - 450 ug/dL 278   % Saturation 15 - 55 % 30   Unsat Iron Binding 110 - 370 ug/dL 194   Glycohemoglobin 4.0 - 5.6 % 6.0 (H)   Estim. Avg Glu mg/dL 126   Cholesterol,Tot 100 - 199 mg/dL 140   Triglycerides 0 - 149 mg/dL 133   HDL >=40 mg/dL 41   LDL <100 mg/dL 72   Ferritin 10.0 - 291.0 ng/mL 69.8   TSH 0.380 - 5.330 uIU/mL 1.800   (L): Data is abnormally low  (H): Data is abnormally high    Assessment & Plan:     64 y.o. female with the following -     1. Primary hypertension  Chronic, controlled.  Blood pressure at goal today.  Continue losartan 25 mg daily.  - Comp Metabolic Panel; Future    2. Hyperlipidemia with target LDL less than 130  Chronic, controlled.  Lipid panel from 2/2025 with normal values.  Continue Lipitor 20 mg daily.  - Lipid Profile; Future    3. Prediabetes  Chronic, worsening.  A1c has increased from 5.9% to 6%.  Patient reports that she has been working on diet and exercise.  Patient was advised to continue to cut down on carbohydrates and sugar containing products.  - HEMOGLOBIN A1C; Future    4. Obesity (BMI 30-39.9)  Chronic, uncontrolled.  BMI of 30.4.  Patient has been eating a diet rich in protein and has been engaging in daily physical activity.  Patient was advised to engage in at least 30 to 40 minutes of daily physical activity and to incorporate cardio and weightlifting.    5. Wellness examination  - HEMOGLOBIN A1C; Future  - CBC WITH DIFFERENTIAL; Future  - Comp Metabolic Panel; Future  - Lipid Profile; Future        Return if symptoms worsen  or fail to improve.    Please note that this dictation was created using voice recognition software. I have made every reasonable attempt to correct obvious errors, but I expect that there are errors of grammar and possibly content that I did not discover before finalizing the note.

## 2025-08-19 ENCOUNTER — APPOINTMENT (OUTPATIENT)
Dept: RADIOLOGY | Facility: MEDICAL CENTER | Age: 65
End: 2025-08-19
Attending: STUDENT IN AN ORGANIZED HEALTH CARE EDUCATION/TRAINING PROGRAM
Payer: COMMERCIAL

## 2025-08-19 ASSESSMENT — FIBROSIS 4 INDEX: FIB4 SCORE: 1.09
